# Patient Record
Sex: FEMALE | Race: WHITE | NOT HISPANIC OR LATINO | Employment: FULL TIME | URBAN - METROPOLITAN AREA
[De-identification: names, ages, dates, MRNs, and addresses within clinical notes are randomized per-mention and may not be internally consistent; named-entity substitution may affect disease eponyms.]

---

## 2021-02-14 PROBLEM — F32.A MOOD DISORDER OF DEPRESSED TYPE: Status: ACTIVE | Noted: 2021-02-14

## 2024-01-03 DIAGNOSIS — I10 HTN (HYPERTENSION): ICD-10-CM

## 2024-01-16 ENCOUNTER — OFFICE VISIT (OUTPATIENT)
Dept: FAMILY MEDICINE | Facility: CLINIC | Age: 44
End: 2024-01-16
Attending: FAMILY MEDICINE
Payer: COMMERCIAL

## 2024-01-16 ENCOUNTER — LAB VISIT (OUTPATIENT)
Dept: LAB | Facility: HOSPITAL | Age: 44
End: 2024-01-16
Attending: FAMILY MEDICINE
Payer: COMMERCIAL

## 2024-01-16 ENCOUNTER — TELEPHONE (OUTPATIENT)
Dept: FAMILY MEDICINE | Facility: CLINIC | Age: 44
End: 2024-01-16

## 2024-01-16 VITALS
SYSTOLIC BLOOD PRESSURE: 132 MMHG | OXYGEN SATURATION: 97 % | TEMPERATURE: 99 F | HEART RATE: 85 BPM | HEIGHT: 65 IN | DIASTOLIC BLOOD PRESSURE: 80 MMHG | WEIGHT: 289.56 LBS | BODY MASS INDEX: 48.24 KG/M2

## 2024-01-16 DIAGNOSIS — K21.9 GASTROESOPHAGEAL REFLUX DISEASE, UNSPECIFIED WHETHER ESOPHAGITIS PRESENT: Primary | ICD-10-CM

## 2024-01-16 DIAGNOSIS — E66.01 MORBID OBESITY WITH BMI OF 45.0-49.9, ADULT: ICD-10-CM

## 2024-01-16 DIAGNOSIS — F32.5 MAJOR DEPRESSIVE DISORDER WITH SINGLE EPISODE, IN REMISSION: ICD-10-CM

## 2024-01-16 DIAGNOSIS — F43.10 PTSD (POST-TRAUMATIC STRESS DISORDER): ICD-10-CM

## 2024-01-16 PROBLEM — R10.9 ABDOMINAL PAIN: Status: RESOLVED | Noted: 2020-10-22 | Resolved: 2024-01-16

## 2024-01-16 LAB
ALBUMIN SERPL BCP-MCNC: 3.7 G/DL (ref 3.5–5.2)
ALP SERPL-CCNC: 106 U/L (ref 55–135)
ALT SERPL W/O P-5'-P-CCNC: 21 U/L (ref 10–44)
ANION GAP SERPL CALC-SCNC: 9 MMOL/L (ref 8–16)
AST SERPL-CCNC: 15 U/L (ref 10–40)
BASOPHILS # BLD AUTO: 0.06 K/UL (ref 0–0.2)
BASOPHILS NFR BLD: 0.7 % (ref 0–1.9)
BILIRUB SERPL-MCNC: 0.2 MG/DL (ref 0.1–1)
BUN SERPL-MCNC: 11 MG/DL (ref 6–20)
CALCIUM SERPL-MCNC: 8.9 MG/DL (ref 8.7–10.5)
CHLORIDE SERPL-SCNC: 105 MMOL/L (ref 95–110)
CHOLEST SERPL-MCNC: 168 MG/DL (ref 120–199)
CHOLEST/HDLC SERPL: 4.2 {RATIO} (ref 2–5)
CO2 SERPL-SCNC: 23 MMOL/L (ref 23–29)
CREAT SERPL-MCNC: 0.8 MG/DL (ref 0.5–1.4)
DIFFERENTIAL METHOD BLD: NORMAL
EOSINOPHIL # BLD AUTO: 0.2 K/UL (ref 0–0.5)
EOSINOPHIL NFR BLD: 2.8 % (ref 0–8)
ERYTHROCYTE [DISTWIDTH] IN BLOOD BY AUTOMATED COUNT: 13 % (ref 11.5–14.5)
EST. GFR  (NO RACE VARIABLE): >60 ML/MIN/1.73 M^2
ESTIMATED AVG GLUCOSE: 105 MG/DL (ref 68–131)
GLUCOSE SERPL-MCNC: 100 MG/DL (ref 70–110)
HBA1C MFR BLD: 5.3 % (ref 4–5.6)
HCT VFR BLD AUTO: 39.9 % (ref 37–48.5)
HDLC SERPL-MCNC: 40 MG/DL (ref 40–75)
HDLC SERPL: 23.8 % (ref 20–50)
HGB BLD-MCNC: 13 G/DL (ref 12–16)
IMM GRANULOCYTES # BLD AUTO: 0.03 K/UL (ref 0–0.04)
IMM GRANULOCYTES NFR BLD AUTO: 0.4 % (ref 0–0.5)
LDLC SERPL CALC-MCNC: 110.8 MG/DL (ref 63–159)
LYMPHOCYTES # BLD AUTO: 2.1 K/UL (ref 1–4.8)
LYMPHOCYTES NFR BLD: 24.6 % (ref 18–48)
MCH RBC QN AUTO: 27.4 PG (ref 27–31)
MCHC RBC AUTO-ENTMCNC: 32.6 G/DL (ref 32–36)
MCV RBC AUTO: 84 FL (ref 82–98)
MONOCYTES # BLD AUTO: 0.6 K/UL (ref 0.3–1)
MONOCYTES NFR BLD: 7.1 % (ref 4–15)
NEUTROPHILS # BLD AUTO: 5.4 K/UL (ref 1.8–7.7)
NEUTROPHILS NFR BLD: 64.4 % (ref 38–73)
NONHDLC SERPL-MCNC: 128 MG/DL
NRBC BLD-RTO: 0 /100 WBC
PLATELET # BLD AUTO: 244 K/UL (ref 150–450)
PMV BLD AUTO: 11.4 FL (ref 9.2–12.9)
POTASSIUM SERPL-SCNC: 4 MMOL/L (ref 3.5–5.1)
PROT SERPL-MCNC: 7 G/DL (ref 6–8.4)
RBC # BLD AUTO: 4.75 M/UL (ref 4–5.4)
SODIUM SERPL-SCNC: 137 MMOL/L (ref 136–145)
TRIGL SERPL-MCNC: 86 MG/DL (ref 30–150)
WBC # BLD AUTO: 8.35 K/UL (ref 3.9–12.7)

## 2024-01-16 PROCEDURE — 3075F SYST BP GE 130 - 139MM HG: CPT | Mod: CPTII,S$GLB,, | Performed by: FAMILY MEDICINE

## 2024-01-16 PROCEDURE — 1159F MED LIST DOCD IN RCRD: CPT | Mod: CPTII,S$GLB,, | Performed by: FAMILY MEDICINE

## 2024-01-16 PROCEDURE — 36415 COLL VENOUS BLD VENIPUNCTURE: CPT | Mod: PO | Performed by: FAMILY MEDICINE

## 2024-01-16 PROCEDURE — 80061 LIPID PANEL: CPT | Performed by: FAMILY MEDICINE

## 2024-01-16 PROCEDURE — 99396 PREV VISIT EST AGE 40-64: CPT | Mod: S$GLB,,, | Performed by: FAMILY MEDICINE

## 2024-01-16 PROCEDURE — 3079F DIAST BP 80-89 MM HG: CPT | Mod: CPTII,S$GLB,, | Performed by: FAMILY MEDICINE

## 2024-01-16 PROCEDURE — 1160F RVW MEDS BY RX/DR IN RCRD: CPT | Mod: CPTII,S$GLB,, | Performed by: FAMILY MEDICINE

## 2024-01-16 PROCEDURE — 83036 HEMOGLOBIN GLYCOSYLATED A1C: CPT | Performed by: FAMILY MEDICINE

## 2024-01-16 PROCEDURE — 80053 COMPREHEN METABOLIC PANEL: CPT | Performed by: FAMILY MEDICINE

## 2024-01-16 PROCEDURE — 85025 COMPLETE CBC W/AUTO DIFF WBC: CPT | Performed by: FAMILY MEDICINE

## 2024-01-16 PROCEDURE — 99999 PR PBB SHADOW E&M-EST. PATIENT-LVL IV: CPT | Mod: PBBFAC,,, | Performed by: FAMILY MEDICINE

## 2024-01-16 PROCEDURE — 3008F BODY MASS INDEX DOCD: CPT | Mod: CPTII,S$GLB,, | Performed by: FAMILY MEDICINE

## 2024-01-16 NOTE — PROGRESS NOTES
Subjective:       Patient ID: Juana Garcia is a 43 y.o. female.    Chief Complaint: Annual Exam    43 y old female here to get est . She has gained a substantial mount of weight . Her job is sedentary . Mental health is stable but she will like to f.u with psych . GERD is stable on PPI . Current opth exam . Will like to have Pap done . Past due for dental  exam     Review of Systems   Constitutional: Negative.    HENT: Negative.     Eyes: Negative.    Respiratory: Negative.     Cardiovascular: Negative.    Gastrointestinal: Negative.    Genitourinary: Negative.    Musculoskeletal: Negative.    Skin: Negative.    Hematological: Negative.        Objective:      Physical Exam  Constitutional:       General: She is not in acute distress.     Appearance: She is well-developed. She is not diaphoretic.   HENT:      Head: Normocephalic and atraumatic.      Right Ear: External ear normal.      Left Ear: External ear normal.      Mouth/Throat:      Pharynx: No oropharyngeal exudate.   Eyes:      General: No scleral icterus.        Right eye: No discharge.         Left eye: No discharge.      Conjunctiva/sclera: Conjunctivae normal.      Pupils: Pupils are equal, round, and reactive to light.   Neck:      Thyroid: No thyromegaly.      Vascular: No JVD.      Trachea: No tracheal deviation.   Cardiovascular:      Rate and Rhythm: Normal rate and regular rhythm.      Heart sounds: Normal heart sounds. No murmur heard.     No friction rub. No gallop.   Pulmonary:      Effort: Pulmonary effort is normal. No respiratory distress.      Breath sounds: Normal breath sounds. No stridor. No wheezing or rales.   Chest:      Chest wall: No tenderness.   Abdominal:      General: Bowel sounds are normal. There is no distension.      Palpations: Abdomen is soft.      Tenderness: There is no abdominal tenderness. There is no guarding or rebound.   Musculoskeletal:         General: Normal range of motion.      Cervical back: Normal range of  motion and neck supple.   Lymphadenopathy:      Cervical: No cervical adenopathy.   Skin:     General: Skin is warm and dry.   Neurological:      Mental Status: She is alert and oriented to person, place, and time.   Psychiatric:         Behavior: Behavior normal.         Thought Content: Thought content normal.         Judgment: Judgment normal.         Assessment:       Juana was seen today for annual exam.    Diagnoses and all orders for this visit:    Gastroesophageal reflux disease, unspecified whether esophagitis present    Morbid obesity with BMI of 45.0-49.9, adult  -     Ambulatory referral/consult to Gynecology; Future  -     CBC Auto Differential; Future  -     Comprehensive Metabolic Panel; Future  -     Hemoglobin A1C; Future  -     Lipid Panel; Future    PTSD (post-traumatic stress disorder)    Major depressive disorder with single episode, in remission       Plan:   PPI   Diet and exercise   F.u with psych

## 2024-01-19 ENCOUNTER — OFFICE VISIT (OUTPATIENT)
Dept: OBSTETRICS AND GYNECOLOGY | Facility: CLINIC | Age: 44
End: 2024-01-19
Attending: FAMILY MEDICINE
Payer: COMMERCIAL

## 2024-01-19 VITALS
SYSTOLIC BLOOD PRESSURE: 120 MMHG | DIASTOLIC BLOOD PRESSURE: 76 MMHG | BODY MASS INDEX: 47.93 KG/M2 | HEIGHT: 65 IN | WEIGHT: 287.69 LBS

## 2024-01-19 DIAGNOSIS — Z12.4 PAPANICOLAOU SMEAR FOR CERVICAL CANCER SCREENING: ICD-10-CM

## 2024-01-19 DIAGNOSIS — Z30.431 IUD CHECK UP: ICD-10-CM

## 2024-01-19 DIAGNOSIS — E66.01 MORBID OBESITY WITH BMI OF 45.0-49.9, ADULT: ICD-10-CM

## 2024-01-19 DIAGNOSIS — N90.89 VULVAR IRRITATION: ICD-10-CM

## 2024-01-19 DIAGNOSIS — Z01.419 ROUTINE GYNECOLOGICAL EXAMINATION: Primary | ICD-10-CM

## 2024-01-19 PROCEDURE — 99386 PREV VISIT NEW AGE 40-64: CPT | Mod: S$GLB,,, | Performed by: NURSE PRACTITIONER

## 2024-01-19 PROCEDURE — 1160F RVW MEDS BY RX/DR IN RCRD: CPT | Mod: CPTII,S$GLB,, | Performed by: NURSE PRACTITIONER

## 2024-01-19 PROCEDURE — 88175 CYTOPATH C/V AUTO FLUID REDO: CPT | Performed by: NURSE PRACTITIONER

## 2024-01-19 PROCEDURE — 3078F DIAST BP <80 MM HG: CPT | Mod: CPTII,S$GLB,, | Performed by: NURSE PRACTITIONER

## 2024-01-19 PROCEDURE — 3008F BODY MASS INDEX DOCD: CPT | Mod: CPTII,S$GLB,, | Performed by: NURSE PRACTITIONER

## 2024-01-19 PROCEDURE — 99999 PR PBB SHADOW E&M-EST. PATIENT-LVL III: CPT | Mod: PBBFAC,,, | Performed by: NURSE PRACTITIONER

## 2024-01-19 PROCEDURE — 1159F MED LIST DOCD IN RCRD: CPT | Mod: CPTII,S$GLB,, | Performed by: NURSE PRACTITIONER

## 2024-01-19 PROCEDURE — 3074F SYST BP LT 130 MM HG: CPT | Mod: CPTII,S$GLB,, | Performed by: NURSE PRACTITIONER

## 2024-01-19 PROCEDURE — 3044F HG A1C LEVEL LT 7.0%: CPT | Mod: CPTII,S$GLB,, | Performed by: NURSE PRACTITIONER

## 2024-01-19 PROCEDURE — 87624 HPV HI-RISK TYP POOLED RSLT: CPT | Performed by: NURSE PRACTITIONER

## 2024-01-19 RX ORDER — CLOTRIMAZOLE AND BETAMETHASONE DIPROPIONATE 10; .64 MG/G; MG/G
CREAM TOPICAL 2 TIMES DAILY
Qty: 45 G | Refills: 2 | Status: SHIPPED | OUTPATIENT
Start: 2024-01-19 | End: 2024-02-18

## 2024-01-19 NOTE — PROGRESS NOTES
"  Subjective:       Patient ID: Juana Garcia is a 43 y.o. female.    Chief Complaint:  Annual Exam and Well Woman      History of Present Illness  HPI  Satisfied with Mirena as method of birth control she also has a tubal ligation to prevent pregnancy   Mammogram is scheduled to be done on 2024   Complains of vaginal irritation   Health Maintenance   Topic Date Due    Mammogram  2023    TETANUS VACCINE  2024    Hepatitis C Screening  Completed    Lipid Panel  Completed     GYN & OB History  No LMP recorded. (Menstrual status: Birth Control).   Date of Last Pap: over 3 years ago     OB History    Para Term  AB Living   3 1 1   2 1   SAB IAB Ectopic Multiple Live Births   2       1      # Outcome Date GA Lbr Jorgito/2nd Weight Sex Delivery Anes PTL Lv   3 Term 07    F Vag-Spont   DEEPTI   2 SAB       None     1 SAB                Review of Systems  Review of Systems        Objective:   /76   Ht 5' 5" (1.651 m)   Wt 130.5 kg (287 lb 11.2 oz)   BMI 47.88 kg/m²    Physical Exam:   Constitutional: She is oriented to person, place, and time. She appears well-developed and well-nourished.        Pulmonary/Chest: Right breast exhibits no inverted nipple, no mass, no nipple discharge, no skin change, no tenderness and no swelling. Left breast exhibits no inverted nipple, no mass, no nipple discharge, no skin change, no tenderness and no swelling.        Abdominal: Soft.     Genitourinary:    Inguinal canal and vagina normal.            Pelvic exam was performed with patient supine.   The external female genitalia was normal.   Genitalia hair distrobution normal .     Labial bartholins normal.Cervix is normal. Vagina exhibits no lesion. No erythema, vaginal discharge, tenderness, bleeding, rectocele, cystocele or prolapse of vaginal walls in the vagina.    No foreign body in the vagina.      No signs of injury in the vagina.      pap smear completedIUD strings visualized.            "   Neurological: She is alert and oriented to person, place, and time.    Skin: Skin is warm and dry.    Psychiatric: She has a normal mood and affect. Her behavior is normal. Judgment and thought content normal.        Assessment:        1. Routine gynecological examination    2. Morbid obesity with BMI of 45.0-49.9, adult    3. Papanicolaou smear for cervical cancer screening    4. IUD check up    5. Vulvar irritation                Plan:            Juana was seen today for annual exam and well woman.    Diagnoses and all orders for this visit:    Routine gynecological examination    Morbid obesity with BMI of 45.0-49.9, adult  -     Ambulatory referral/consult to Gynecology    Papanicolaou smear for cervical cancer screening  -     Liquid-Based Pap Smear, Screening  -     HPV High Risk Genotypes, PCR    IUD check up    Vulvar irritation  -     clotrimazole-betamethasone 1-0.05% (LOTRISONE) cream; Apply topically 2 (two) times daily.      Return to clinic in one year for WWRIK

## 2024-01-24 LAB
FINAL PATHOLOGIC DIAGNOSIS: NORMAL
Lab: NORMAL

## 2024-01-25 LAB
HPV HR 12 DNA SPEC QL NAA+PROBE: NEGATIVE
HPV16 AG SPEC QL: NEGATIVE
HPV18 DNA SPEC QL NAA+PROBE: NEGATIVE

## 2024-01-29 ENCOUNTER — HOSPITAL ENCOUNTER (EMERGENCY)
Facility: HOSPITAL | Age: 44
Discharge: HOME | End: 2024-01-29
Attending: EMERGENCY MEDICINE
Payer: COMMERCIAL

## 2024-01-29 ENCOUNTER — PATIENT MESSAGE (OUTPATIENT)
Dept: FAMILY MEDICINE | Facility: CLINIC | Age: 44
End: 2024-01-29
Payer: COMMERCIAL

## 2024-01-29 VITALS
TEMPERATURE: 99 F | SYSTOLIC BLOOD PRESSURE: 154 MMHG | WEIGHT: 291.44 LBS | OXYGEN SATURATION: 98 % | HEART RATE: 87 BPM | RESPIRATION RATE: 18 BRPM | DIASTOLIC BLOOD PRESSURE: 87 MMHG | BODY MASS INDEX: 48.5 KG/M2

## 2024-01-29 DIAGNOSIS — R06.02 SOB (SHORTNESS OF BREATH): Primary | ICD-10-CM

## 2024-01-29 DIAGNOSIS — R03.0 ELEVATED BLOOD PRESSURE READING: ICD-10-CM

## 2024-01-29 DIAGNOSIS — E66.01 MORBID OBESITY: ICD-10-CM

## 2024-01-29 DIAGNOSIS — E66.01 MORBID OBESITY WITH BMI OF 45.0-49.9, ADULT: Primary | ICD-10-CM

## 2024-01-29 DIAGNOSIS — R07.9 CHEST PAIN: ICD-10-CM

## 2024-01-29 DIAGNOSIS — F12.10 TETRAHYDROCANNABINOL (THC) USE DISORDER, MILD, ABUSE: ICD-10-CM

## 2024-01-29 DIAGNOSIS — F41.9 ANXIETY: ICD-10-CM

## 2024-01-29 LAB
ALBUMIN SERPL BCP-MCNC: 3.7 G/DL (ref 3.5–5.2)
ALP SERPL-CCNC: 100 U/L (ref 55–135)
ALT SERPL W/O P-5'-P-CCNC: 23 U/L (ref 10–44)
AMPHET+METHAMPHET UR QL: NEGATIVE
ANION GAP SERPL CALC-SCNC: 10 MMOL/L (ref 8–16)
AST SERPL-CCNC: 18 U/L (ref 10–40)
BARBITURATES UR QL SCN>200 NG/ML: NEGATIVE
BASOPHILS # BLD AUTO: 0.05 K/UL (ref 0–0.2)
BASOPHILS NFR BLD: 0.6 % (ref 0–1.9)
BENZODIAZ UR QL SCN>200 NG/ML: NEGATIVE
BILIRUB SERPL-MCNC: 0.4 MG/DL (ref 0.1–1)
BNP SERPL-MCNC: 39 PG/ML (ref 0–99)
BUN SERPL-MCNC: 8 MG/DL (ref 6–20)
BZE UR QL SCN: NEGATIVE
CALCIUM SERPL-MCNC: 9.1 MG/DL (ref 8.7–10.5)
CANNABINOIDS UR QL SCN: ABNORMAL
CHLORIDE SERPL-SCNC: 105 MMOL/L (ref 95–110)
CK SERPL-CCNC: 79 U/L (ref 20–180)
CO2 SERPL-SCNC: 23 MMOL/L (ref 23–29)
CREAT SERPL-MCNC: 0.8 MG/DL (ref 0.5–1.4)
CREAT UR-MCNC: 31.9 MG/DL (ref 15–325)
DIFFERENTIAL METHOD BLD: NORMAL
EOSINOPHIL # BLD AUTO: 0.2 K/UL (ref 0–0.5)
EOSINOPHIL NFR BLD: 2 % (ref 0–8)
ERYTHROCYTE [DISTWIDTH] IN BLOOD BY AUTOMATED COUNT: 12.9 % (ref 11.5–14.5)
EST. GFR  (NO RACE VARIABLE): >60 ML/MIN/1.73 M^2
GLUCOSE SERPL-MCNC: 96 MG/DL (ref 70–110)
HCT VFR BLD AUTO: 37 % (ref 37–48.5)
HCV AB SERPL QL IA: NEGATIVE
HEP C VIRUS HOLD SPECIMEN: NORMAL
HGB BLD-MCNC: 12.5 G/DL (ref 12–16)
HIV 1+2 AB+HIV1 P24 AG SERPL QL IA: NEGATIVE
IMM GRANULOCYTES # BLD AUTO: 0.04 K/UL (ref 0–0.04)
IMM GRANULOCYTES NFR BLD AUTO: 0.5 % (ref 0–0.5)
LYMPHOCYTES # BLD AUTO: 2.1 K/UL (ref 1–4.8)
LYMPHOCYTES NFR BLD: 23.8 % (ref 18–48)
MCH RBC QN AUTO: 27.5 PG (ref 27–31)
MCHC RBC AUTO-ENTMCNC: 33.8 G/DL (ref 32–36)
MCV RBC AUTO: 82 FL (ref 82–98)
METHADONE UR QL SCN>300 NG/ML: NEGATIVE
MONOCYTES # BLD AUTO: 0.5 K/UL (ref 0.3–1)
MONOCYTES NFR BLD: 6.2 % (ref 4–15)
NEUTROPHILS # BLD AUTO: 5.8 K/UL (ref 1.8–7.7)
NEUTROPHILS NFR BLD: 66.9 % (ref 38–73)
NRBC BLD-RTO: 0 /100 WBC
OPIATES UR QL SCN: NEGATIVE
PCP UR QL SCN>25 NG/ML: NEGATIVE
PLATELET # BLD AUTO: 242 K/UL (ref 150–450)
PMV BLD AUTO: 10.2 FL (ref 9.2–12.9)
POTASSIUM SERPL-SCNC: 4 MMOL/L (ref 3.5–5.1)
PROT SERPL-MCNC: 7.3 G/DL (ref 6–8.4)
RBC # BLD AUTO: 4.54 M/UL (ref 4–5.4)
SODIUM SERPL-SCNC: 138 MMOL/L (ref 136–145)
TOXICOLOGY INFORMATION: ABNORMAL
TROPONIN I SERPL DL<=0.01 NG/ML-MCNC: <0.006 NG/ML (ref 0–0.03)
WBC # BLD AUTO: 8.71 K/UL (ref 3.9–12.7)

## 2024-01-29 PROCEDURE — 80307 DRUG TEST PRSMV CHEM ANLYZR: CPT | Performed by: EMERGENCY MEDICINE

## 2024-01-29 PROCEDURE — 85025 COMPLETE CBC W/AUTO DIFF WBC: CPT | Performed by: NURSE PRACTITIONER

## 2024-01-29 PROCEDURE — 84484 ASSAY OF TROPONIN QUANT: CPT | Performed by: NURSE PRACTITIONER

## 2024-01-29 PROCEDURE — 87389 HIV-1 AG W/HIV-1&-2 AB AG IA: CPT | Performed by: EMERGENCY MEDICINE

## 2024-01-29 PROCEDURE — 82550 ASSAY OF CK (CPK): CPT | Performed by: NURSE PRACTITIONER

## 2024-01-29 PROCEDURE — 99285 EMERGENCY DEPT VISIT HI MDM: CPT | Mod: 25

## 2024-01-29 PROCEDURE — 93010 ELECTROCARDIOGRAM REPORT: CPT | Mod: ,,, | Performed by: INTERNAL MEDICINE

## 2024-01-29 PROCEDURE — 80053 COMPREHEN METABOLIC PANEL: CPT | Performed by: NURSE PRACTITIONER

## 2024-01-29 PROCEDURE — 86803 HEPATITIS C AB TEST: CPT | Performed by: EMERGENCY MEDICINE

## 2024-01-29 PROCEDURE — 93005 ELECTROCARDIOGRAM TRACING: CPT

## 2024-01-29 PROCEDURE — 83880 ASSAY OF NATRIURETIC PEPTIDE: CPT | Performed by: NURSE PRACTITIONER

## 2024-01-29 RX ORDER — HYDROCHLOROTHIAZIDE 12.5 MG/1
12.5 TABLET ORAL DAILY
Qty: 30 TABLET | Refills: 0 | Status: SHIPPED | OUTPATIENT
Start: 2024-01-29 | End: 2025-01-28

## 2024-01-29 RX ORDER — ASPIRIN 325 MG
325 TABLET ORAL
Status: DISCONTINUED | OUTPATIENT
Start: 2024-01-29 | End: 2024-01-29

## 2024-01-29 NOTE — FIRST PROVIDER EVALUATION
Medical screening examination initiated.  I have conducted a focused provider triage encounter, findings are as follows:    Brief history of present illness: 43 year old female with complaint of SOB and chest discomfort X 3 days.     There were no vitals filed for this visit.    Pertinent physical exam: alert, no respiratory distress    Brief workup plan: cardiac work up    Preliminary workup initiated; this workup will be continued and followed by the physician or advanced practice provider that is assigned to the patient when roomed.

## 2024-02-09 ENCOUNTER — PATIENT MESSAGE (OUTPATIENT)
Dept: RADIOLOGY | Facility: HOSPITAL | Age: 44
End: 2024-02-09
Payer: COMMERCIAL

## 2024-03-05 ENCOUNTER — CLINICAL SUPPORT (OUTPATIENT)
Dept: INTERNAL MEDICINE | Facility: CLINIC | Age: 44
End: 2024-03-05
Payer: COMMERCIAL

## 2024-03-05 DIAGNOSIS — E66.01 MORBID OBESITY WITH BMI OF 45.0-49.9, ADULT: ICD-10-CM

## 2024-03-05 NOTE — PROGRESS NOTES
"The patient location is: [place]  The chief complaint leading to consultation is: nutrition referral    Visit type: audiovisual    Face to Face time with patient: 65 minutes  75 minutes of total time spent on the encounter, which includes face to face time and non-face to face time preparing to see the patient (eg, review of tests), Obtaining and/or reviewing separately obtained history, Documenting clinical information in the electronic or other health record, Independently interpreting results (not separately reported) and communicating results to the patient/family/caregiver, or Care coordination (not separately reported).         Each patient to whom he or she provides medical services by telemedicine is:  (1) informed of the relationship between the physician and patient and the respective role of any other health care provider with respect to management of the patient; and (2) notified that he or she may decline to receive medical services by telemedicine and may withdraw from such care at any time.    Notes:   Nutrition Assessment      Client name:  Juana Garcia  :  1980  Age:  43 y.o.  Gender:  female    Client states:  referred by Amanda Lopes MD with a diagnosis of:   -Morbid obesity with BMI of 45.0-49.9, adult       Reports needing to lose weight.  Emergency room recently and was told to follow dash diet and to restrict intake of sodium to 1500 mg to improve blood pressure.    Exercise: none  Works 9 hour days.  works long days.  Works form home 2 days per week + 3 days in the office.    Sample intake:  Breakfast: yes on work days// Fage yogurt (plain) with plain oatmeal + 1 cup of no sugar added peaches/pears  Lunch: recipes from DASH diet cookbook// uses Mrs. Prince seasonings  Snack: cucumbers// fruits   Dinner:  Drinks: diet coke, water, coffee when home    Goal: 145-150 lbs    Anthropometrics  Height:  65"     Weight:  275.6 lbs (self reported from home scale)  BMI:  " 46  % Body Fat:  n/a     RECENT WEIGHTS      Weight (lb) Weight (kg) BMI (Calculated)   10/22/2020 255.73  116  42.6    1/4/2021 269.18  122.1  44.8    1/13/2021 268.08  121.6  44.6    3/10/2021 272.16  123.45     5/8/2021 273.15  123.9     5/31/2021 268.63  121.85     2/22/2022 268  121.564  44.6    1/16/2024 289.57  131.35  48.2    1/19/2024 287.7  130.5  47.9    1/29/2024 291.45  132.2           Clinical Signs/Symptoms  N/V/D:  none noted  Appetite:  good       Past Medical History:   Diagnosis Date    Arthritis     Fibromyalgia     GERD (gastroesophageal reflux disease)     HTN (hypertension) 1/4/2021    PONV (postoperative nausea and vomiting)        Past Surgical History:   Procedure Laterality Date    CLEFT LIP REPAIR      x 6    COLONOSCOPY N/A 10/22/2020    Procedure: COLONOSCOPY;  Surgeon: Orlin Fisher MD;  Location: [facility];  Service: Endoscopy;  Laterality: N/A;    COSMETIC SURGERY      cleft lip repair    ESOPHAGOGASTRODUODENOSCOPY      ESOPHAGOGASTRODUODENOSCOPY N/A 10/22/2020    Procedure: EGD (ESOPHAGOGASTRODUODENOSCOPY);  Surgeon: Orlin Fisher MD;  Location: [facility];  Service: Endoscopy;  Laterality: N/A;    HAND SURGERY      LAPAROSCOPIC OCCLUSION OF OVIDUCTS BY DEVICE Bilateral 11/7/2018    Procedure: OCCLUSION, FALLOPIAN TUBE, LAPAROSCOPIC, USING DEVICE;  Surgeon: Alayna Arechiga MD;  Location: [facility] OR;  Service: OB/GYN;  Laterality: Bilateral;    REMOVAL OF INTRAUTERINE DEVICE (IUD) N/A 11/7/2018    Procedure: REMOVAL, INTRAUTERINE DEVICE;  Surgeon: Alayna Arechiga MD;  Location: [facility] OR;  Service: OB/GYN;  Laterality: N/A;    TUBAL LIGATION         Medications    has a current medication list which includes the following prescription(s): fluticasone propionate, fluvoxamine, hydrochlorothiazide, loratadine, nexium, and trazodone.    Vitamins, Minerals, and/or Supplements:  not discussed     Food/Medication Interactions:  Reviewed     Food Allergies or Intolerances:  NKFA noted  in chart     Social History    Marital status:    Employment:  yes    Social History     Tobacco Use    Smoking status: Former     Current packs/day: 0.00     Types: Cigarettes     Quit date: 6/25/2013     Years since quitting: 10.7    Smokeless tobacco: Never   Substance Use Topics    Alcohol use: No        Lab Reports   Sodium   Date Value Ref Range Status   01/29/2024 138 136 - 145 mmol/L Final     Potassium   Date Value Ref Range Status   01/29/2024 4.0 3.5 - 5.1 mmol/L Final     Chloride   Date Value Ref Range Status   01/29/2024 105 95 - 110 mmol/L Final     CO2   Date Value Ref Range Status   01/29/2024 23 23 - 29 mmol/L Final     Glucose   Date Value Ref Range Status   01/29/2024 96 70 - 110 mg/dL Final     BUN   Date Value Ref Range Status   01/29/2024 8 6 - 20 mg/dL Final     Creatinine   Date Value Ref Range Status   01/29/2024 0.8 0.5 - 1.4 mg/dL Final     Calcium   Date Value Ref Range Status   01/29/2024 9.1 8.7 - 10.5 mg/dL Final     Total Protein   Date Value Ref Range Status   01/29/2024 7.3 6.0 - 8.4 g/dL Final     Albumin   Date Value Ref Range Status   01/29/2024 3.7 3.5 - 5.2 g/dL Final     Total Bilirubin   Date Value Ref Range Status   01/29/2024 0.4 0.1 - 1.0 mg/dL Final     Comment:     For infants and newborns, interpretation of results should be based  on gestational age, weight and in agreement with clinical  observations.    Premature Infant recommended reference ranges:  Up to 24 hours.............<8.0 mg/dL  Up to 48 hours............<12.0 mg/dL  3-5 days..................<15.0 mg/dL  6-29 days.................<15.0 mg/dL       Alkaline Phosphatase   Date Value Ref Range Status   01/29/2024 100 55 - 135 U/L Final     AST   Date Value Ref Range Status   01/29/2024 18 10 - 40 U/L Final     ALT   Date Value Ref Range Status   01/29/2024 23 10 - 44 U/L Final     Anion Gap   Date Value Ref Range Status   01/29/2024 10 8 - 16 mmol/L Final     eGFR if    Date Value Ref  Range Status   09/15/2020 >60.0 >60 mL/min/1.73 m^2 Final     eGFR if non    Date Value Ref Range Status   09/15/2020 >60.0 >60 mL/min/1.73 m^2 Final     Comment:     Calculation used to obtain the estimated glomerular filtration  rate (eGFR) is the CKD-EPI equation.         Lab Results   Component Value Date    WBC 8.71 01/29/2024    HGB 12.5 01/29/2024    HCT 37.0 01/29/2024    MCV 82 01/29/2024     01/29/2024        Lab Results   Component Value Date    CHOL 168 01/16/2024     Lab Results   Component Value Date    HDL 40 01/16/2024     Lab Results   Component Value Date    LDLCALC 110.8 01/16/2024     Lab Results   Component Value Date    TRIG 86 01/16/2024     Lab Results   Component Value Date    CHOLHDL 23.8 01/16/2024     Lab Results   Component Value Date    HGBA1C 5.3 01/16/2024     BP Readings from Last 1 Encounters:   01/29/24 (!) 154/87       Food History  Provided above    Exercise History:  provided above    Cultural/Spiritual/Personal Preferences:  None identified    Support System:  family/friends    State of Change:  Contemplation    Barriers to Change:  none    Diagnosis    obesity related to excess energy intake as evidenced by BMI 46.    Intervention    RMR (Method:  [place] St. Jeor):  1906 kcal  Activity Factor:   1.2     SLIM:  2287 kcal    Goals:  1.  100-150 grams of protein per day  2.  Increase daily fiber. Gradually work towards goal of 25 grams of fiber per day.     Nutrition Education  The following education was provided to the patient:  Discussed meal planning/[employer]'s My Plate design.  Discussed weight management.  Suggested dietary modifications based on current dietary behaviors and individual food preferences.  Discussed importance of small behavior/habit changes in improving long-term adherence and sustainability.    Patient verbalized understanding of nutrition education and recommendations received.    Handouts Provided, emailed   Meal  Ideas    Monitoring/Evaluation    Monitor the following:  Weight  BMI  Caloric intake      Follow Up Plan:  2 months

## 2024-04-26 ENCOUNTER — HOSPITAL ENCOUNTER (OUTPATIENT)
Dept: RADIOLOGY | Facility: HOSPITAL | Age: 44
Discharge: HOME | End: 2024-04-26
Attending: FAMILY MEDICINE
Payer: COMMERCIAL

## 2024-04-26 PROCEDURE — 77067 SCR MAMMO BI INCL CAD: CPT | Mod: TC

## 2024-04-26 PROCEDURE — 77067 SCR MAMMO BI INCL CAD: CPT | Mod: 26,,, | Performed by: RADIOLOGY

## 2024-04-26 PROCEDURE — 77063 BREAST TOMOSYNTHESIS BI: CPT | Mod: 26,,, | Performed by: RADIOLOGY

## 2024-05-10 ENCOUNTER — OFFICE VISIT (OUTPATIENT)
Dept: FAMILY MEDICINE | Facility: CLINIC | Age: 44
End: 2024-05-10
Payer: COMMERCIAL

## 2024-05-10 VITALS
HEART RATE: 98 BPM | SYSTOLIC BLOOD PRESSURE: 132 MMHG | WEIGHT: 283.31 LBS | BODY MASS INDEX: 47.2 KG/M2 | HEIGHT: 65 IN | OXYGEN SATURATION: 98 % | DIASTOLIC BLOOD PRESSURE: 82 MMHG

## 2024-05-10 DIAGNOSIS — J02.9 SORE THROAT: ICD-10-CM

## 2024-05-10 DIAGNOSIS — L29.9 ITCHING: ICD-10-CM

## 2024-05-10 DIAGNOSIS — R09.82 PND (POST-NASAL DRIP): Primary | ICD-10-CM

## 2024-05-10 LAB
CTP QC/QA: YES
S PYO RRNA THROAT QL PROBE: NEGATIVE

## 2024-05-10 PROCEDURE — 99999 PR PBB SHADOW E&M-EST. PATIENT-LVL III: CPT | Mod: PBBFAC,,, | Performed by: NURSE PRACTITIONER

## 2024-05-10 PROCEDURE — 3044F HG A1C LEVEL LT 7.0%: CPT | Mod: CPTII,S$GLB,, | Performed by: NURSE PRACTITIONER

## 2024-05-10 PROCEDURE — 3075F SYST BP GE 130 - 139MM HG: CPT | Mod: CPTII,S$GLB,, | Performed by: NURSE PRACTITIONER

## 2024-05-10 PROCEDURE — 99213 OFFICE O/P EST LOW 20 MIN: CPT | Mod: S$GLB,,, | Performed by: NURSE PRACTITIONER

## 2024-05-10 PROCEDURE — 3008F BODY MASS INDEX DOCD: CPT | Mod: CPTII,S$GLB,, | Performed by: NURSE PRACTITIONER

## 2024-05-10 PROCEDURE — 1159F MED LIST DOCD IN RCRD: CPT | Mod: CPTII,S$GLB,, | Performed by: NURSE PRACTITIONER

## 2024-05-10 PROCEDURE — 87880 STREP A ASSAY W/OPTIC: CPT | Mod: QW,S$GLB,, | Performed by: NURSE PRACTITIONER

## 2024-05-10 PROCEDURE — 3079F DIAST BP 80-89 MM HG: CPT | Mod: CPTII,S$GLB,, | Performed by: NURSE PRACTITIONER

## 2024-05-10 PROCEDURE — 1160F RVW MEDS BY RX/DR IN RCRD: CPT | Mod: CPTII,S$GLB,, | Performed by: NURSE PRACTITIONER

## 2024-05-10 RX ORDER — LEVOCETIRIZINE DIHYDROCHLORIDE 5 MG/1
5 TABLET, FILM COATED ORAL NIGHTLY
Qty: 30 TABLET | Refills: 0 | Status: SHIPPED | OUTPATIENT
Start: 2024-05-10 | End: 2025-05-10

## 2024-05-10 RX ORDER — TRIAMCINOLONE ACETONIDE 1 MG/G
OINTMENT TOPICAL 2 TIMES DAILY
Qty: 30 G | Refills: 1 | Status: SHIPPED | OUTPATIENT
Start: 2024-05-10

## 2024-05-10 NOTE — PROGRESS NOTES
"Subjective:       Patient ID: Juana Garcia is a 44 y.o. female.    Chief Complaint: Neck Pain  Pt reports to clinic with chief complaint of "throat discomfort feels stiff and full".  "Glands are tender".  No fever, chills cough.  Notes rhinorrhea and post nasal drip.  No acid reflux.  Additionally, pt complains of itching to left foot.  Denies rash eruption.  "I rub it until the skin comes off".  Denies exposure to new chemicals, plants, food.     Past Medical History:   Diagnosis Date    Arthritis     Fibromyalgia     GERD (gastroesophageal reflux disease)     HTN (hypertension) 1/4/2021    PONV (postoperative nausea and vomiting)       Current Outpatient Medications on File Prior to Visit   Medication Sig Dispense Refill    fluticasone propionate (FLONASE) 50 mcg/actuation nasal spray 1 spray by Each Nostril route daily as needed.      fluvoxaMINE (LUVOX) 50 MG Tab tablet Take 1 tablet (50 mg total) by mouth every evening. 30 tablet 2    hydroCHLOROthiazide (HYDRODIURIL) 12.5 MG Tab Take 1 tablet (12.5 mg total) by mouth once daily. 30 tablet 0    NEXIUM 20 mg capsule Take 20 mg by mouth before breakfast.      traZODone (DESYREL) 150 MG tablet Take 1 tablet (150 mg total) by mouth nightly as needed for Insomnia. (Patient taking differently: Take 75 mg by mouth nightly as needed for Insomnia.) 90 tablet 0    [DISCONTINUED] loratadine (CLARITIN) 10 mg tablet Take 1 tablet (10 mg total) by mouth once daily. 90 tablet 3     No current facility-administered medications on file prior to visit.      Sore Throat   This is a new problem. The current episode started in the past 7 days. The problem has been unchanged. There has been no fever. The pain is mild. Pertinent negatives include no congestion, coughing, shortness of breath, swollen glands or trouble swallowing. She has tried nothing for the symptoms.     Review of Systems   Constitutional: Negative.    HENT:  Positive for rhinorrhea and sore throat. Negative for " congestion, hearing loss and trouble swallowing.    Respiratory:  Negative for cough, chest tightness, shortness of breath and wheezing.    Cardiovascular: Negative.    Gastrointestinal: Negative.    Genitourinary: Negative.    Skin:  Positive for rash. Negative for wound.   Psychiatric/Behavioral: Negative.         Objective:      Physical Exam  Vitals reviewed.   Constitutional:       Appearance: She is obese.   HENT:      Head: Normocephalic.      Right Ear: External ear normal.      Left Ear: External ear normal.      Nose: Rhinorrhea present.      Mouth/Throat:      Comments: cobblestone  Eyes:      Extraocular Movements: Extraocular movements intact.   Cardiovascular:      Rate and Rhythm: Normal rate.      Heart sounds: Normal heart sounds.   Pulmonary:      Effort: Pulmonary effort is normal.      Breath sounds: Normal breath sounds.   Musculoskeletal:      Cervical back: Normal range of motion.   Skin:     Coloration: Skin is not jaundiced.      Findings: No rash.   Neurological:      Mental Status: She is alert and oriented to person, place, and time.   Psychiatric:         Behavior: Behavior normal.         Assessment:       1. PND (post-nasal drip)    2. Sore throat    3. Itching        Plan:   PND (post-nasal drip)  -     levocetirizine (XYZAL) 5 MG tablet; Take 1 tablet (5 mg total) by mouth every evening.  Dispense: 30 tablet; Refill: 0    Sore throat  -     POCT Rapid Strep A    Itching  -     triamcinolone acetonide 0.1% (KENALOG) 0.1 % ointment; Apply topically 2 (two) times daily.  Dispense: 30 g; Refill: 1      No follow-ups on file.

## 2024-06-19 ENCOUNTER — TELEPHONE (OUTPATIENT)
Dept: FAMILY MEDICINE | Facility: CLINIC | Age: 44
End: 2024-06-19
Payer: COMMERCIAL

## 2024-06-19 NOTE — TELEPHONE ENCOUNTER
----- Message from Priyanka Sanchez sent at 6/19/2024  8:29 AM CDT -----  Contact: Juana  .Type:  Patient Returning Call    Who Called: Juana   Who Left Message for Patient: nurse   Does the patient know what this is regarding?: yes   Would the patient rather a call back or a response via [facility]?  Call back   Best Call Back Number: .. [phone]    Additional Information:          Thanks

## 2024-06-25 ENCOUNTER — OFFICE VISIT (OUTPATIENT)
Dept: FAMILY MEDICINE | Facility: CLINIC | Age: 44
End: 2024-06-25
Attending: FAMILY MEDICINE
Payer: COMMERCIAL

## 2024-06-25 ENCOUNTER — TELEPHONE (OUTPATIENT)
Dept: PHYSICAL MEDICINE AND REHAB | Facility: CLINIC | Age: 44
End: 2024-06-25
Payer: COMMERCIAL

## 2024-06-25 ENCOUNTER — TELEPHONE (OUTPATIENT)
Dept: FAMILY MEDICINE | Facility: CLINIC | Age: 44
End: 2024-06-25

## 2024-06-25 VITALS
BODY MASS INDEX: 47.71 KG/M2 | DIASTOLIC BLOOD PRESSURE: 80 MMHG | WEIGHT: 286.38 LBS | SYSTOLIC BLOOD PRESSURE: 120 MMHG | OXYGEN SATURATION: 97 % | TEMPERATURE: 99 F | HEART RATE: 80 BPM | HEIGHT: 65 IN

## 2024-06-25 DIAGNOSIS — B35.3 TINEA PEDIS OF LEFT FOOT: ICD-10-CM

## 2024-06-25 DIAGNOSIS — R20.2 PARESTHESIA OF BOTH FEET: Primary | ICD-10-CM

## 2024-06-25 PROCEDURE — 3008F BODY MASS INDEX DOCD: CPT | Mod: CPTII,S$GLB,, | Performed by: FAMILY MEDICINE

## 2024-06-25 PROCEDURE — 1160F RVW MEDS BY RX/DR IN RCRD: CPT | Mod: CPTII,S$GLB,, | Performed by: FAMILY MEDICINE

## 2024-06-25 PROCEDURE — 3074F SYST BP LT 130 MM HG: CPT | Mod: CPTII,S$GLB,, | Performed by: FAMILY MEDICINE

## 2024-06-25 PROCEDURE — 99999 PR PBB SHADOW E&M-EST. PATIENT-LVL IV: CPT | Mod: PBBFAC,,, | Performed by: FAMILY MEDICINE

## 2024-06-25 PROCEDURE — 1159F MED LIST DOCD IN RCRD: CPT | Mod: CPTII,S$GLB,, | Performed by: FAMILY MEDICINE

## 2024-06-25 PROCEDURE — 3079F DIAST BP 80-89 MM HG: CPT | Mod: CPTII,S$GLB,, | Performed by: FAMILY MEDICINE

## 2024-06-25 PROCEDURE — 99214 OFFICE O/P EST MOD 30 MIN: CPT | Mod: S$GLB,,, | Performed by: FAMILY MEDICINE

## 2024-06-25 PROCEDURE — 3044F HG A1C LEVEL LT 7.0%: CPT | Mod: CPTII,S$GLB,, | Performed by: FAMILY MEDICINE

## 2024-06-25 RX ORDER — FLUCONAZOLE 150 MG/1
TABLET ORAL
Qty: 4 TABLET | Refills: 0 | Status: SHIPPED | OUTPATIENT
Start: 2024-06-25

## 2024-06-25 RX ORDER — OMEPRAZOLE 20 MG/1
20 TABLET, DELAYED RELEASE ORAL EVERY MORNING
COMMUNITY

## 2024-06-25 NOTE — PROGRESS NOTES
Subjective:       Patient ID: Juana Arellano is a 44 y.o. female.    Chief Complaint: Foot Problem (Left foot is itching) and Tingling (To both feet )    44 y old female with pruritus and flakiness in between toes for months . She has tried OTC Lamisil with no relief . Also with tingling sensation in both feet . She feels that she drags her feet at times . + Lumbar back pain .       Review of Systems   Constitutional: Negative.    HENT: Negative.     Eyes: Negative.    Respiratory: Negative.     Cardiovascular: Negative.    Gastrointestinal: Negative.    Genitourinary: Negative.    Musculoskeletal: Negative.    Skin:  Positive for rash.   Neurological:  Positive for weakness.   Hematological: Negative.        Objective:      Physical Exam  Vitals and nursing note reviewed.   Constitutional:       General: She is not in acute distress.     Appearance: She is well-developed. She is not diaphoretic.   HENT:      Head: Normocephalic and atraumatic.      Right Ear: External ear normal.      Left Ear: External ear normal.      Nose: Nose normal.   Eyes:      General: No scleral icterus.        Left eye: No discharge.      Conjunctiva/sclera: Conjunctivae normal.      Pupils: Pupils are equal, round, and reactive to light.   Neck:      Thyroid: No thyromegaly.      Vascular: No JVD.      Trachea: No tracheal deviation.   Cardiovascular:      Rate and Rhythm: Normal rate and regular rhythm.      Heart sounds: Normal heart sounds. No murmur heard.     No friction rub. No gallop.   Pulmonary:      Effort: Pulmonary effort is normal. No respiratory distress.      Breath sounds: Normal breath sounds. No wheezing or rales.   Chest:      Chest wall: No tenderness.   Abdominal:      General: Bowel sounds are normal. There is no distension.      Palpations: Abdomen is soft. There is no mass.      Tenderness: There is no abdominal tenderness. There is no guarding.   Musculoskeletal:      Cervical back: Normal range of motion and  neck supple.   Feet:      Left foot:      Skin integrity: Skin breakdown and erythema present.   Lymphadenopathy:      Cervical: No cervical adenopathy.         Assessment:       1. Paresthesia of both feet    2. Tinea pedis of left foot        Plan:     Juana was seen today for foot problem and tingling.    Diagnoses and all orders for this visit:    Paresthesia of both feet  -     EMG W/ ULTRASOUND AND NERVE CONDUCTION TEST 2 Extremities; Future    Tinea pedis of left foot    Other orders  -     fluconazole (DIFLUCAN) 150 MG Tab; 1 tab po weekly for 4 weeks     EMG   Prn f.u

## 2024-06-28 ENCOUNTER — OFFICE VISIT (OUTPATIENT)
Dept: ENDOCRINOLOGY | Facility: CLINIC | Age: 44
End: 2024-06-28
Payer: COMMERCIAL

## 2024-06-28 DIAGNOSIS — E16.2 HYPOGLYCEMIA: Primary | ICD-10-CM

## 2024-06-28 PROCEDURE — 3044F HG A1C LEVEL LT 7.0%: CPT | Mod: CPTII,95,, | Performed by: INTERNAL MEDICINE

## 2024-06-28 PROCEDURE — 99204 OFFICE O/P NEW MOD 45 MIN: CPT | Mod: 95,,, | Performed by: INTERNAL MEDICINE

## 2024-06-28 PROCEDURE — 1159F MED LIST DOCD IN RCRD: CPT | Mod: CPTII,95,, | Performed by: INTERNAL MEDICINE

## 2024-06-28 PROCEDURE — 1160F RVW MEDS BY RX/DR IN RCRD: CPT | Mod: CPTII,95,, | Performed by: INTERNAL MEDICINE

## 2024-06-28 NOTE — PROGRESS NOTES
The patient location is:  [place]  The chief complaint leading to consultation is:  Hypoglycemia    Visit type: audiovisual    Face to Face time with patient:  15 minutes  35 minutes of total time spent on the encounter, which includes face to face time and non-face to face time preparing to see the patient (eg, review of tests), Obtaining and/or reviewing separately obtained history, Documenting clinical information in the electronic or other health record, Independently interpreting results (not separately reported) and communicating results to the patient/family/caregiver, or Care coordination (not separately reported).         Each patient to whom he or she provides medical services by telemedicine is:  (1) informed of the relationship between the physician and patient and the respective role of any other health care provider with respect to management of the patient; and (2) notified that he or she may decline to receive medical services by telemedicine and may withdraw from such care at any time.    Notes:        Subjective:      Patient ID: Juana Arellano is referred by No ref. provider found     Chief Complaint:  Hypoglycemia    History of Present Illness    Juana Arellano is a 44 y.o. female who presents for evaluation of hypoglycemia.     Patient reports feeling lightheaded after eating almost on a daily basis.  She had tried intermittent fasting but could not continue it due to dizziness.  Also reports dizziness when she stands up.  She has lightheadedness since 11 years of age.  Reports similar symptoms in family members and was told it was due to low blood sugar.  Has not checked her blood sugars during such episodes.     When she gets dizzy, she drinks water and gets something to eat like candy/juice/fruits with resolution of symptoms.   Denies nocturnal symptoms.   She reports feeling hungry and nauseaus when she wakes up.   Takes PPI for GERD.     Suspected prediabetes - blood sugars on  fingersticks were normal.  Reports possible GDM in 2006.   No history of gastric bypass surgery.    Has taken Metformin in the past - discontinued due to side effects.     Weight currently 286 lbs  BMI 47  Patient does not have regular exercise regimen.    Diet: Tries to eat less salt, snacks more on fruits.     FH of diabetes: Denies.       Lab Results   Component Value Date    GLU 96 01/29/2024     01/16/2024    GLU 90 09/15/2020    GLU 90 02/24/2020    GLU 92 10/03/2018    GLU 87 04/17/2018     (H) 01/25/2014    GLU 91 01/17/2012    GLU 76 10/25/2010    GLU 90 10/30/2009    GLU 82 08/20/2009    GLU 92 02/23/2009    GLU 86 06/02/2008    GLU 85 02/13/2008     Lab Results   Component Value Date    HGBA1C 5.3 01/16/2024    HGBA1C 5.5 09/13/2021    HGBA1C 5.4 08/11/2020    HGBA1C 5.3 02/24/2020    HGBA1C 5.0 04/17/2018       Quit smoking in 2015  Occasional ETOH.   Takes Delta gummies.     H/o Yeast infection - currently on diflucan.    ROS:   As above    Objective:     There were no vitals taken for this visit.  There is no height or weight on file to calculate BMI.    Physical Exam  Constitutional:       General: She is not in acute distress.     Appearance: She is not ill-appearing.   HENT:      Head: Normocephalic.   Eyes:      Conjunctiva/sclera: Conjunctivae normal.   Pulmonary:      Effort: Pulmonary effort is normal.   Neurological:      Mental Status: She is alert and oriented to person, place, and time.       Lab Review:   Lab Results   Component Value Date    HGBA1C 5.3 01/16/2024     Lab Results   Component Value Date    CHOL 168 01/16/2024    HDL 40 01/16/2024    LDLCALC 110.8 01/16/2024    TRIG 86 01/16/2024    CHOLHDL 23.8 01/16/2024     Lab Results   Component Value Date     01/29/2024    K 4.0 01/29/2024     01/29/2024    CO2 23 01/29/2024    GLU 96 01/29/2024    BUN 8 01/29/2024    CREATININE 0.8 01/29/2024    CALCIUM 9.1 01/29/2024    PROT 7.3 01/29/2024    ALBUMIN 3.7  01/29/2024    BILITOT 0.4 01/29/2024    ALKPHOS 100 01/29/2024    AST 18 01/29/2024    ALT 23 01/29/2024    ANIONGAP 10 01/29/2024    EGFRNORACEVR >60 01/29/2024    TSH 1.551 09/01/2022      Vit D, 25-Hydroxy   Date Value Ref Range Status   08/11/2020 26 (L) 30 - 96 ng/mL Final     Comment:     Vitamin D deficiency.........<10 ng/mL                              Vitamin D insufficiency......10-29 ng/mL       Vitamin D sufficiency........> or equal to 30 ng/mL  Vitamin D toxicity............>100 ng/mL         Assessment and Plan     Problem List Items Addressed This Visit          Endocrine    Hypoglycemia - Primary       Reports feeling lightheaded after eating.  Had similar symptoms since 11 years of age.  Has not checked her blood sugars during such episodes.  Reports symptoms improve after drinking water and taking a carbohydrate snacks.  Recent A1c was normal.  No documented hypoglycemia on labs.    Discussed checking blood sugars and document hypoglycemia during episodes of lightheadedness.  Encouraged diet modification, take more protein and complex carbohydrates.  Recommend regular exercise regimen.               Mariajose DUBOSE Rai, MD

## 2024-07-10 PROBLEM — E16.2 HYPOGLYCEMIA: Status: ACTIVE | Noted: 2024-07-10

## 2024-07-10 NOTE — ASSESSMENT & PLAN NOTE
Reports feeling lightheaded after eating.  Had similar symptoms since 11 years of age.  Has not checked her blood sugars during such episodes.  Reports symptoms improve after drinking water and taking a carbohydrate snacks.  Recent A1c was normal.  No documented hypoglycemia on labs.    Discussed checking blood sugars and document hypoglycemia during episodes of lightheadedness.  Encouraged diet modification, take more protein and complex carbohydrates.  Recommend regular exercise regimen.

## 2024-08-13 ENCOUNTER — PATIENT MESSAGE (OUTPATIENT)
Dept: FAMILY MEDICINE | Facility: CLINIC | Age: 44
End: 2024-08-13
Payer: COMMERCIAL

## 2024-08-13 DIAGNOSIS — F32.1 CURRENT MODERATE EPISODE OF MAJOR DEPRESSIVE DISORDER WITHOUT PRIOR EPISODE: Primary | ICD-10-CM

## 2024-10-15 ENCOUNTER — HOSPITAL ENCOUNTER (OUTPATIENT)
Dept: RADIOLOGY | Facility: HOSPITAL | Age: 44
Discharge: HOME | End: 2024-10-15
Attending: FAMILY MEDICINE
Payer: COMMERCIAL

## 2024-10-15 ENCOUNTER — OFFICE VISIT (OUTPATIENT)
Dept: FAMILY MEDICINE | Facility: CLINIC | Age: 44
End: 2024-10-15
Attending: FAMILY MEDICINE
Payer: COMMERCIAL

## 2024-10-15 VITALS
DIASTOLIC BLOOD PRESSURE: 82 MMHG | OXYGEN SATURATION: 97 % | BODY MASS INDEX: 48 KG/M2 | SYSTOLIC BLOOD PRESSURE: 124 MMHG | HEART RATE: 94 BPM | HEIGHT: 65 IN | TEMPERATURE: 98 F | WEIGHT: 288.13 LBS

## 2024-10-15 DIAGNOSIS — R06.02 SOB (SHORTNESS OF BREATH): ICD-10-CM

## 2024-10-15 DIAGNOSIS — J01.40 ACUTE PANSINUSITIS, RECURRENCE NOT SPECIFIED: ICD-10-CM

## 2024-10-15 DIAGNOSIS — R06.02 SOB (SHORTNESS OF BREATH): Primary | ICD-10-CM

## 2024-10-15 PROCEDURE — 3074F SYST BP LT 130 MM HG: CPT | Mod: CPTII,S$GLB,, | Performed by: FAMILY MEDICINE

## 2024-10-15 PROCEDURE — 3079F DIAST BP 80-89 MM HG: CPT | Mod: CPTII,S$GLB,, | Performed by: FAMILY MEDICINE

## 2024-10-15 PROCEDURE — 3008F BODY MASS INDEX DOCD: CPT | Mod: CPTII,S$GLB,, | Performed by: FAMILY MEDICINE

## 2024-10-15 PROCEDURE — 3044F HG A1C LEVEL LT 7.0%: CPT | Mod: CPTII,S$GLB,, | Performed by: FAMILY MEDICINE

## 2024-10-15 PROCEDURE — 99214 OFFICE O/P EST MOD 30 MIN: CPT | Mod: S$GLB,,, | Performed by: FAMILY MEDICINE

## 2024-10-15 PROCEDURE — 1160F RVW MEDS BY RX/DR IN RCRD: CPT | Mod: CPTII,S$GLB,, | Performed by: FAMILY MEDICINE

## 2024-10-15 PROCEDURE — 1159F MED LIST DOCD IN RCRD: CPT | Mod: CPTII,S$GLB,, | Performed by: FAMILY MEDICINE

## 2024-10-15 PROCEDURE — 71046 X-RAY EXAM CHEST 2 VIEWS: CPT | Mod: TC,PO

## 2024-10-15 PROCEDURE — 99999 PR PBB SHADOW E&M-EST. PATIENT-LVL IV: CPT | Mod: PBBFAC,,, | Performed by: FAMILY MEDICINE

## 2024-10-15 PROCEDURE — 71046 X-RAY EXAM CHEST 2 VIEWS: CPT | Mod: 26,,, | Performed by: RADIOLOGY

## 2024-10-15 RX ORDER — AMOXICILLIN 875 MG/1
875 TABLET, FILM COATED ORAL EVERY 12 HOURS
COMMUNITY
Start: 2024-10-07 | End: 2024-10-17

## 2024-10-15 RX ORDER — DEXBROMPHENIRAMINE MALEATE, PHENYLEPHRINE HYDROCHLORIDE 2; 7.5 MG/1; MG/1
1 TABLET ORAL 3 TIMES DAILY
COMMUNITY
Start: 2024-10-07

## 2024-10-15 NOTE — PROGRESS NOTES
Subjective:       Patient ID: Juana Arellano is a 44 y.o. female.    Chief Complaint: Cough and Nasal Congestion    44 y old female with nasal  congestion , productive cough , sob and chills for 10 d . Evaluated at   1 w ago . She tested negative for COVID . Started on Amoxicillin , ala hist  . SOB has worsened over the last 3 days .     Review of Systems   Constitutional: Negative.    HENT:  Positive for postnasal drip, rhinorrhea, sinus pressure and sinus pain.    Eyes: Negative.    Respiratory:  Positive for cough and shortness of breath.    Cardiovascular: Negative.    Gastrointestinal: Negative.    Genitourinary: Negative.    Musculoskeletal: Negative.    Skin: Negative.    Hematological: Negative.        Objective:      Physical Exam  Vitals and nursing note reviewed.   Constitutional:       General: She is not in acute distress.     Appearance: She is well-developed. She is not diaphoretic.   HENT:      Head: Normocephalic and atraumatic.      Right Ear: External ear normal.      Left Ear: External ear normal.      Nose: Nose normal.   Eyes:      General: No scleral icterus.        Left eye: No discharge.      Conjunctiva/sclera: Conjunctivae normal.      Pupils: Pupils are equal, round, and reactive to light.   Neck:      Thyroid: No thyromegaly.      Vascular: No JVD.      Trachea: No tracheal deviation.   Cardiovascular:      Rate and Rhythm: Normal rate and regular rhythm.      Heart sounds: Normal heart sounds. No murmur heard.     No friction rub. No gallop.   Pulmonary:      Effort: Pulmonary effort is normal. No respiratory distress.      Breath sounds: Normal breath sounds. No wheezing or rales.   Chest:      Chest wall: No tenderness.   Abdominal:      General: Bowel sounds are normal. There is no distension.      Palpations: Abdomen is soft. There is no mass.      Tenderness: There is no abdominal tenderness. There is no guarding.   Musculoskeletal:      Cervical back: Normal range of motion and  neck supple.   Lymphadenopathy:      Cervical: No cervical adenopathy.         Assessment:      Juana was seen today for cough and nasal congestion.    Diagnoses and all orders for this visit:    SOB (shortness of breath)  -     X-Ray Chest PA And Lateral; Future    Acute pansinusitis, recurrence not specified    Other orders  -     levoFLOXacin (LEVAQUIN) 750 MG tablet; Take 1 tablet (750 mg total) by mouth once daily.           Plan:   Call or return to clinic prn if these symptoms worsen or fail to improve as anticipated.

## 2024-10-16 RX ORDER — LEVOFLOXACIN 750 MG/1
750 TABLET ORAL DAILY
Qty: 7 TABLET | Refills: 0 | Status: SHIPPED | OUTPATIENT
Start: 2024-10-16

## 2024-12-16 ENCOUNTER — OFFICE VISIT (OUTPATIENT)
Dept: FAMILY MEDICINE | Facility: CLINIC | Age: 44
End: 2024-12-16
Payer: COMMERCIAL

## 2024-12-16 VITALS
TEMPERATURE: 97 F | BODY MASS INDEX: 48.19 KG/M2 | WEIGHT: 289.25 LBS | DIASTOLIC BLOOD PRESSURE: 84 MMHG | HEIGHT: 65 IN | SYSTOLIC BLOOD PRESSURE: 130 MMHG | HEART RATE: 93 BPM | OXYGEN SATURATION: 97 %

## 2024-12-16 DIAGNOSIS — F33.2 MDD (MAJOR DEPRESSIVE DISORDER), RECURRENT SEVERE, WITHOUT PSYCHOSIS: ICD-10-CM

## 2024-12-16 DIAGNOSIS — J30.9 ALLERGIC RHINITIS, UNSPECIFIED SEASONALITY, UNSPECIFIED TRIGGER: Primary | ICD-10-CM

## 2024-12-16 PROCEDURE — 3044F HG A1C LEVEL LT 7.0%: CPT | Mod: CPTII,S$GLB,, | Performed by: NURSE PRACTITIONER

## 2024-12-16 PROCEDURE — 1160F RVW MEDS BY RX/DR IN RCRD: CPT | Mod: CPTII,S$GLB,, | Performed by: NURSE PRACTITIONER

## 2024-12-16 PROCEDURE — 3008F BODY MASS INDEX DOCD: CPT | Mod: CPTII,S$GLB,, | Performed by: NURSE PRACTITIONER

## 2024-12-16 PROCEDURE — 99213 OFFICE O/P EST LOW 20 MIN: CPT | Mod: S$GLB,,, | Performed by: NURSE PRACTITIONER

## 2024-12-16 PROCEDURE — 1159F MED LIST DOCD IN RCRD: CPT | Mod: CPTII,S$GLB,, | Performed by: NURSE PRACTITIONER

## 2024-12-16 PROCEDURE — 3079F DIAST BP 80-89 MM HG: CPT | Mod: CPTII,S$GLB,, | Performed by: NURSE PRACTITIONER

## 2024-12-16 PROCEDURE — 3075F SYST BP GE 130 - 139MM HG: CPT | Mod: CPTII,S$GLB,, | Performed by: NURSE PRACTITIONER

## 2024-12-16 PROCEDURE — 99999 PR PBB SHADOW E&M-EST. PATIENT-LVL III: CPT | Mod: PBBFAC,,, | Performed by: NURSE PRACTITIONER

## 2024-12-16 RX ORDER — METHYLPREDNISOLONE 4 MG/1
TABLET ORAL
Qty: 1 EACH | Refills: 0 | Status: SHIPPED | OUTPATIENT
Start: 2024-12-16

## 2024-12-16 RX ORDER — AZELASTINE 1 MG/ML
2 SPRAY, METERED NASAL 2 TIMES DAILY
Qty: 30 ML | Refills: 0 | Status: SHIPPED | OUTPATIENT
Start: 2024-12-16 | End: 2025-12-16

## 2024-12-16 RX ORDER — FLUTICASONE PROPIONATE 50 MCG
2 SPRAY, SUSPENSION (ML) NASAL DAILY
Qty: 16 G | Refills: 0 | Status: SHIPPED | OUTPATIENT
Start: 2024-12-16

## 2024-12-16 RX ORDER — LEVOCETIRIZINE DIHYDROCHLORIDE 5 MG/1
5 TABLET, FILM COATED ORAL NIGHTLY
Qty: 90 TABLET | Refills: 3 | Status: SHIPPED | OUTPATIENT
Start: 2024-12-16 | End: 2025-12-16

## 2024-12-16 NOTE — PROGRESS NOTES
Subjective:       Patient ID: Juana Arellano is a 44 y.o. female.    Chief Complaint: Nasal Congestion (Several weeks ), Chest Pain (About a week ), Ear Fullness (Several weeks), Dizziness (Several weeks ), and Leg Pain (Right leg at times , x 1 week )    History of Present Illness    Patient presents today with congestion and dizziness. She reports experiencing congestion and a persistent cough since October. She denies taking any allergy medications. She experiences dizziness with positional changes, specifically when rolling over in bed.      ROS:  General: -fever, -chills, -fatigue, -weight gain, -weight loss  Eyes: -vision changes, -redness, -discharge  ENT: -ear pain, +nasal congestion, -sore throat  Cardiovascular: -chest pain, -palpitations, -lower extremity edema  Respiratory: +cough, -shortness of breath  Gastrointestinal: -abdominal pain, -nausea, -vomiting, -diarrhea, -constipation, -blood in stool  Genitourinary: -dysuria, -hematuria, -frequency  Musculoskeletal: -joint pain, -muscle pain  Skin: -rash, -lesion  Neurological: -headache, +dizziness, -numbness, -tingling  Psychiatric: -anxiety, -depression, -sleep difficulty        Past Medical History:   Diagnosis Date    Arthritis     Fibromyalgia     GERD (gastroesophageal reflux disease)     HTN (hypertension) 1/4/2021    PONV (postoperative nausea and vomiting)       Current Outpatient Medications on File Prior to Visit   Medication Sig Dispense Refill    cholecalciferol, vitamin D3, (VITAMIN D3 ORAL) 3 gummies a day      fluvoxaMINE (LUVOX) 50 MG Tab tablet Take 1 tablet (50 mg total) by mouth every evening. 30 tablet 2    hydroCHLOROthiazide (HYDRODIURIL) 12.5 MG Tab Take 1 tablet (12.5 mg total) by mouth once daily. 30 tablet 0    omeprazole (PRILOSEC OTC) 20 MG tablet Take 20 mg by mouth every morning.      traZODone (DESYREL) 150 MG tablet Take 1 tablet (150 mg total) by mouth nightly as needed for Insomnia. 90 tablet 0    [DISCONTINUED] ALAHIST  PE 2-7.5 mg Tab Take 1 tablet by mouth 3 (three) times daily. (Patient not taking: Reported on 12/16/2024)      [DISCONTINUED] levoFLOXacin (LEVAQUIN) 750 MG tablet Take 1 tablet (750 mg total) by mouth once daily. (Patient not taking: Reported on 12/16/2024) 7 tablet 0     No current facility-administered medications on file prior to visit.          Objective:      Physical Exam    General: In no acute distress.  Head: Normocephalic. Non traumatic.  Eyes: PERRLA. EOMs full. Conjunctivae clear. Fundi grossly normal.  Ears: EACs clear. TMs normal.  Nose: Mucosa pink. Mucosa moist. No obstruction. POST-NASAL DRIP.  Throat: Clear. No exudates. No lesions. COBBLESTONING.  Neck: Supple. No masses. No thyromegaly. No bruits.  Chest: Lungs clear. No rales. No rhonchi. No wheezes.  Heart: RRR. No murmurs. No rubs. No gallops.  Abdomen: Soft. No tenderness. No masses. BS normal.  : Normal external genitalia. No lesions. No discharge. No hernias  noted.  Back: Normal curvature. No scoliosis. No tenderness.  Extremities: Warm. Well perfused. No upper extremity edema. No lower extremity edema. FROM. No deformities. No joint erythema.  Neuro: No focal deficits appreciated. Good muscle tone. Normal response to visual stimuli. Normal response to auditory stimuli.  Skin: Normal. No rashes. No lesions noted.          Assessment/Plan:     1. Allergic rhinitis, unspecified seasonality, unspecified trigger    2. MDD (major depressive disorder), recurrent severe, without psychosis       Assessment & Plan    SEASONAL ALLERGIC RHINITIS:  - Assessed patient's symptoms as likely due to allergies, given the prolonged duration and nature of symptoms.  - Determined need for combination therapy to address allergic symptoms, including nasal congestion and post-nasal drip.  - Will initiate steroid treatment due to persistence of symptoms over several weeks.  - Discussed that combination therapy is necessary to address various allergic  symptoms.  - Started Aspirin nasal spray, to be used daily.  - Started Flonase nasal spray, to be used daily.  - Started Xyzal, to be taken daily.  - Started a course of oral steroids.  - Can skip a day of allergy medication occasionally if feeling excessively dry.               No follow-ups on file.    This note was generated with the assistance of ambient listening technology. Verbal consent was obtained by the patient and accompanying visitor(s) for the recording of patient appointment to facilitate this note. I attest to having reviewed and edited the generated note for accuracy, though some syntax or spelling errors may persist. Please contact the author of this note for any clarification.       Answers submitted by the patient for this visit:  Review of Systems Questionnaire (Submitted on 12/16/2024)  activity change: Yes  unexpected weight change: No  neck pain: Yes  hearing loss: No  rhinorrhea: Yes  trouble swallowing: No  eye discharge: No  visual disturbance: No  chest tightness: Yes  wheezing: Yes  chest pain: No  palpitations: Yes  blood in stool: No  constipation: No  vomiting: Yes  diarrhea: No  polydipsia: No  polyuria: No  difficulty urinating: No  hematuria: No  menstrual problem: No  dysuria: No  joint swelling: No  arthralgias: Yes  headaches: Yes  weakness: No  confusion: No  dysphoric mood: Yes

## 2024-12-18 ENCOUNTER — NURSE TRIAGE (OUTPATIENT)
Dept: ADMINISTRATIVE | Facility: CLINIC | Age: 44
End: 2024-12-18
Payer: COMMERCIAL

## 2024-12-18 ENCOUNTER — PATIENT MESSAGE (OUTPATIENT)
Dept: FAMILY MEDICINE | Facility: CLINIC | Age: 44
End: 2024-12-18
Payer: COMMERCIAL

## 2024-12-18 NOTE — TELEPHONE ENCOUNTER
[place]    PCP:  Dr. Amanda Neveslogg    Pt reports seen by PCP yesterday and prescribed steroid.  C/O exhaustion, fatigue, sleepiness, ongoing intermittent dizziness/palpitations (unchanged), chest discomfort, weakness, nausea, vomiting, urinating frequently, dry mouth, confusion, and excessive thirst.  Denies H/O DM, SOB, and fever.  Per protocol, care advised is call  now.  Pt VU and she or her Daughter will call EMS.  Advised to call for worsening/questions/concerns.  VU.    Reason for Disposition   Acting confused (e.g., disoriented, slurred speech)    Additional Information   Negative: Unconscious or difficult to awaken    Protocols used: Diabetes - High Blood Sugar-A-OH

## 2025-01-12 DIAGNOSIS — J30.9 ALLERGIC RHINITIS, UNSPECIFIED SEASONALITY, UNSPECIFIED TRIGGER: ICD-10-CM

## 2025-01-13 RX ORDER — DULOXETIN HYDROCHLORIDE 20 MG/1
20 CAPSULE, DELAYED RELEASE ORAL EVERY MORNING
COMMUNITY
Start: 2025-01-07

## 2025-01-13 RX ORDER — MECLIZINE HYDROCHLORIDE 25 MG/1
25 TABLET ORAL 3 TIMES DAILY
COMMUNITY
Start: 2024-12-27

## 2025-01-14 RX ORDER — FLUTICASONE PROPIONATE 50 MCG
SPRAY, SUSPENSION (ML) NASAL
Qty: 16 G | Refills: 0 | Status: SHIPPED | OUTPATIENT
Start: 2025-01-14

## 2025-02-12 DIAGNOSIS — I10 ESSENTIAL HYPERTENSION: ICD-10-CM

## 2025-03-20 ENCOUNTER — PATIENT MESSAGE (OUTPATIENT)
Dept: INTERNAL MEDICINE | Facility: CLINIC | Age: 45
End: 2025-03-20

## 2025-03-20 ENCOUNTER — LAB VISIT (OUTPATIENT)
Dept: LAB | Facility: HOSPITAL | Age: 45
End: 2025-03-20
Attending: FAMILY MEDICINE
Payer: COMMERCIAL

## 2025-03-20 ENCOUNTER — OFFICE VISIT (OUTPATIENT)
Dept: INTERNAL MEDICINE | Facility: CLINIC | Age: 45
End: 2025-03-20
Payer: COMMERCIAL

## 2025-03-20 VITALS
HEART RATE: 107 BPM | DIASTOLIC BLOOD PRESSURE: 80 MMHG | TEMPERATURE: 99 F | OXYGEN SATURATION: 97 % | RESPIRATION RATE: 18 BRPM | HEIGHT: 65 IN | BODY MASS INDEX: 47.9 KG/M2 | SYSTOLIC BLOOD PRESSURE: 132 MMHG | WEIGHT: 287.5 LBS

## 2025-03-20 DIAGNOSIS — Z12.31 BREAST CANCER SCREENING BY MAMMOGRAM: ICD-10-CM

## 2025-03-20 DIAGNOSIS — Z79.899 LONG TERM CURRENT USE OF DIURETIC: ICD-10-CM

## 2025-03-20 DIAGNOSIS — G47.00 INSOMNIA, UNSPECIFIED TYPE: Chronic | ICD-10-CM

## 2025-03-20 DIAGNOSIS — I10 PRIMARY HYPERTENSION: Chronic | ICD-10-CM

## 2025-03-20 DIAGNOSIS — F41.9 RECURRENT MODERATE MAJOR DEPRESSIVE DISORDER WITH ANXIETY: Chronic | ICD-10-CM

## 2025-03-20 DIAGNOSIS — E66.01 MORBID OBESITY WITH BMI OF 45.0-49.9, ADULT: Chronic | ICD-10-CM

## 2025-03-20 DIAGNOSIS — K59.09 CHRONIC CONSTIPATION: Chronic | ICD-10-CM

## 2025-03-20 DIAGNOSIS — F41.1 GENERALIZED ANXIETY DISORDER WITH PANIC ATTACKS: Chronic | ICD-10-CM

## 2025-03-20 DIAGNOSIS — Z13.220 ENCOUNTER FOR LIPID SCREENING FOR CARDIOVASCULAR DISEASE: ICD-10-CM

## 2025-03-20 DIAGNOSIS — F41.0 GENERALIZED ANXIETY DISORDER WITH PANIC ATTACKS: Chronic | ICD-10-CM

## 2025-03-20 DIAGNOSIS — F33.1 RECURRENT MODERATE MAJOR DEPRESSIVE DISORDER WITH ANXIETY: Chronic | ICD-10-CM

## 2025-03-20 DIAGNOSIS — Z13.6 ENCOUNTER FOR LIPID SCREENING FOR CARDIOVASCULAR DISEASE: ICD-10-CM

## 2025-03-20 DIAGNOSIS — J30.1 SEASONAL ALLERGIC RHINITIS DUE TO POLLEN: Chronic | ICD-10-CM

## 2025-03-20 DIAGNOSIS — Z13.1 SCREENING FOR DIABETES MELLITUS: ICD-10-CM

## 2025-03-20 DIAGNOSIS — K59.09 CHRONIC CONSTIPATION: Primary | Chronic | ICD-10-CM

## 2025-03-20 PROBLEM — F32.A MOOD DISORDER OF DEPRESSED TYPE: Status: RESOLVED | Noted: 2021-02-14 | Resolved: 2025-03-20

## 2025-03-20 PROBLEM — F33.0 RECURRENT MILD MAJOR DEPRESSIVE DISORDER WITH ANXIETY: Chronic | Status: ACTIVE | Noted: 2021-07-22

## 2025-03-20 LAB
ESTIMATED AVG GLUCOSE: 105 MG/DL (ref 68–131)
HBA1C MFR BLD: 5.3 % (ref 4–5.6)
TSH SERPL DL<=0.005 MIU/L-ACNC: 1.65 UIU/ML (ref 0.4–4)

## 2025-03-20 PROCEDURE — 80053 COMPREHEN METABOLIC PANEL: CPT | Performed by: FAMILY MEDICINE

## 2025-03-20 PROCEDURE — 99999 PR PBB SHADOW E&M-EST. PATIENT-LVL IV: CPT | Mod: PBBFAC,,, | Performed by: FAMILY MEDICINE

## 2025-03-20 PROCEDURE — 99215 OFFICE O/P EST HI 40 MIN: CPT | Mod: S$GLB,,, | Performed by: FAMILY MEDICINE

## 2025-03-20 PROCEDURE — 3079F DIAST BP 80-89 MM HG: CPT | Mod: CPTII,S$GLB,, | Performed by: FAMILY MEDICINE

## 2025-03-20 PROCEDURE — 3075F SYST BP GE 130 - 139MM HG: CPT | Mod: CPTII,S$GLB,, | Performed by: FAMILY MEDICINE

## 2025-03-20 PROCEDURE — 3008F BODY MASS INDEX DOCD: CPT | Mod: CPTII,S$GLB,, | Performed by: FAMILY MEDICINE

## 2025-03-20 PROCEDURE — 84443 ASSAY THYROID STIM HORMONE: CPT | Performed by: FAMILY MEDICINE

## 2025-03-20 PROCEDURE — G2211 COMPLEX E/M VISIT ADD ON: HCPCS | Mod: S$GLB,,, | Performed by: FAMILY MEDICINE

## 2025-03-20 PROCEDURE — 1160F RVW MEDS BY RX/DR IN RCRD: CPT | Mod: CPTII,S$GLB,, | Performed by: FAMILY MEDICINE

## 2025-03-20 PROCEDURE — 83036 HEMOGLOBIN GLYCOSYLATED A1C: CPT | Performed by: FAMILY MEDICINE

## 2025-03-20 PROCEDURE — 36415 COLL VENOUS BLD VENIPUNCTURE: CPT | Performed by: FAMILY MEDICINE

## 2025-03-20 PROCEDURE — 1159F MED LIST DOCD IN RCRD: CPT | Mod: CPTII,S$GLB,, | Performed by: FAMILY MEDICINE

## 2025-03-20 PROCEDURE — 80061 LIPID PANEL: CPT | Performed by: FAMILY MEDICINE

## 2025-03-20 RX ORDER — AZELASTINE 1 MG/ML
2 SPRAY, METERED NASAL 2 TIMES DAILY
Qty: 30 ML | Refills: 11 | Status: SHIPPED | OUTPATIENT
Start: 2025-03-20

## 2025-03-20 RX ORDER — PLANT STANOL ESTER 450 MG
1 TABLET ORAL DAILY
COMMUNITY
Start: 2025-03-20

## 2025-03-20 RX ORDER — LEVOCETIRIZINE DIHYDROCHLORIDE 5 MG/1
5 TABLET, FILM COATED ORAL NIGHTLY
Qty: 90 TABLET | Refills: 3 | Status: SHIPPED | OUTPATIENT
Start: 2025-03-20 | End: 2026-03-20

## 2025-03-20 RX ORDER — AMOXICILLIN 250 MG
1-2 CAPSULE ORAL DAILY
COMMUNITY
Start: 2025-03-20

## 2025-03-20 RX ORDER — HYDROCHLOROTHIAZIDE 12.5 MG/1
12.5 TABLET ORAL DAILY
Qty: 90 TABLET | Refills: 3 | Status: SHIPPED | OUTPATIENT
Start: 2025-03-20

## 2025-03-20 RX ORDER — TIRZEPATIDE 2.5 MG/.5ML
2.5 INJECTION, SOLUTION SUBCUTANEOUS
Qty: 2 ML | Refills: 2 | Status: SHIPPED | OUTPATIENT
Start: 2025-03-20

## 2025-03-20 RX ORDER — POLYETHYLENE GLYCOL 3350 17 G/17G
17 POWDER, FOR SOLUTION ORAL DAILY
Qty: 510 G | Refills: 11 | Status: SHIPPED | OUTPATIENT
Start: 2025-03-20

## 2025-03-20 RX ORDER — FLUTICASONE PROPIONATE 50 MCG
2 SPRAY, SUSPENSION (ML) NASAL DAILY
Qty: 16 G | Refills: 11 | Status: SHIPPED | OUTPATIENT
Start: 2025-03-20

## 2025-03-20 RX ORDER — FLUVOXAMINE MALEATE 50 MG/1
100 TABLET, FILM COATED ORAL NIGHTLY
COMMUNITY

## 2025-03-20 NOTE — PROGRESS NOTES
OFFICE VISIT 3/20/25  2:00 PM CDT    History of Present Illness    CHIEF COMPLAINT:  Juana presents today for an initial visit to establish care    CURRENT SYMPTOMS:  She reports a lingering head cold with symptoms persisting longer than expected, though denies feeling unwell overall.    MENTAL HEALTH:  She reports poorly controlled depression with fluctuating symptoms related to work situation and job search difficulties. She expresses difficulty maintaining hope but denies passive suicidal ideation. She has diagnoses of panic disorder and PTSD, with trauma history dating back to early childhood. She currently takes fluvoxamine 50 mg and follows with psychiatrist Dr. Richmond for management.    GASTROINTESTINAL:  She reports constipation with bowel movements every 3-4 days without intervention. She achieves daily bowel movements with use of Miralax or CBD gummies.    ALLERGIES AND ENT:  She reports generalized sensitivity to irritants despite negative allergy testing. She uses Xyzal for pollen-related symptoms and Astelin nasal spray. She reports previous use of Montelukast which was effective and well-tolerated.    MEDICAL HISTORY:  She has a history of chronic anemia which previously prevented her from donating blood.    CURRENT MEDICATIONS:  She takes hydrochlorothiazide in the morning for blood pressure management with good tolerance.       Review of Systems   Constitutional:  Negative for unexpected weight change.   HENT:  Negative for hearing loss, rhinorrhea and trouble swallowing.    Eyes:  Negative for discharge and visual disturbance.   Respiratory:  Negative for chest tightness and wheezing.    Cardiovascular:  Negative for chest pain and palpitations.   Gastrointestinal:  Positive for constipation. Negative for blood in stool, diarrhea and vomiting.   Endocrine: Negative for polydipsia and polyuria.   Genitourinary:  Negative for difficulty urinating, dysuria, hematuria and menstrual problem.    Musculoskeletal:  Negative for joint swelling and neck pain.   Psychiatric/Behavioral:  Positive for dysphoric mood. Negative for confusion and suicidal ideas.        Assessment & Plan    IMPRESSION:   Assessed constipation and recommended combination of fiber supplements, prunes, and as-needed laxatives to manage symptoms. Evaluated current psychiatric medications and dosing, noting difficulty with medication adherence. Reviewed allergy symptoms and current treatments. Considered HTN management. Discussed obesity treatment options, focusing on GLP-1 receptor agonists (e.g., tirzepatide) as potential therapy. Performed brief physical exam, noting clear lungs, regular heart rhythm, and normal thyroid on palpation.    PATIENT EDUCATION:   Explained proper technique for using nasal sprays to maximize effectiveness. Discussed metabolic nature of obesity and challenges of weight loss with elevated BMI. Provided information on GLP-1 receptor agonists for weight management, including brand names and insurance considerations. Explained compounding pharmacies as potential alternative for medication access.    ACTION ITEMS/LIFESTYLE:   Juana to monitor bowel movements and adjust constipation management accordingly. Juana to implement daily use of pill dispenser to improve medication adherence. Recommend increasing daily fiber intake through diet and supplements. Juana to research insurance coverage for weight loss medications and explore alternative options if needed. Juana to download and prepare [employer] genetic test results for discussion at next appointment. Juana to send [employer] genetic test results as PDF attachment via patient portal message before next appointment. Juana to review provided information on weight loss medications and insurance coverage before next appointment.    MEDICATIONS:   Started Konsyl (psyllium husk) and Senna with Docusate as needed for constipation. Continued fluvoxamine 100 mg daily.  Restarted Xyzal as needed for allergy symptoms. Continued Azelastine nasal spray, 2 sprays each nostril daily. Started Montelukast daily for allergy management. Continued HCTZ in the morning for HTN. Started [employer] potassium supplement, 1 tablet daily due to diuretic use. Prescribed Zepbound (tirzepatide) for weight management, pending insurance approval.    ORDERS:   Ordered routine labs including thyroid function test, lipid panel, diabetes screening, and metabolic panel due to weight concerns. Annual mammogram ordered as a standing order.    FOLLOW UP:   Juana to schedule mammogram in 2 weeks. Follow up in about a month via video visit to discuss weight management medication results and options.       1. Chronic constipation  -     senna-docusate 8.6-50 mg (SENNA WITH DOCUSATE SODIUM) 8.6-50 mg per tablet; Take 1-2 tablets by mouth once daily. Use as directed on product packaging.  -     psyllium husk 6 gram/6 gram Powd; Use as directed on product packaging (for fiber supplement). Ask pharmacist for guidance if needed.  -     polyethylene glycol (MIRALAX) 17 gram/dose powder; Take 17 g by mouth once daily. Use as directed. Hold if stool becomes too loose or frequent.  Dispense: 510 g; Refill: 11  -     TSH; Future; Expected date: 03/20/2025    2. Insomnia, unspecified type  -     Comprehensive Metabolic Panel; Future; Expected date: 03/20/2025    3. Recurrent moderate major depressive disorder with anxiety  Overview:  PSYCHIATRIST: Clement King MD  [address]  [facility] [address]  Phone: [phone]  Via Fax: [phone]       4. Generalized anxiety disorder with panic attacks  Overview:  PSYCHIATRIST: Clement King MD  [address]  [facility] [address]  Phone: [phone]  Via Fax: [phone]       5. Seasonal allergic rhinitis due to pollen  -     azelastine (ASTELIN) 137 mcg (0.1 %) nasal spray; 2 sprays (274 mcg total) by Nasal route 2 (two) times daily.  Dispense: 30  "mL; Refill: 11  -     fluticasone propionate (FLONASE) 50 mcg/actuation nasal spray; 2 sprays (100 mcg total) by Each Nostril route once daily.  Dispense: 16 g; Refill: 11  -     levocetirizine (XYZAL) 5 MG tablet; Take 1 tablet (5 mg total) by mouth every evening.  Dispense: 90 tablet; Refill: 3    6. Primary hypertension  -     hydroCHLOROthiazide 12.5 MG Tab; Take 1 tablet (12.5 mg total) by mouth once daily.  Dispense: 90 tablet; Refill: 3  -     Comprehensive Metabolic Panel; Future; Expected date: 03/20/2025    7. Long term current use of diuretic  -     potassium gluconate 550 mg (90 mg) Tab; Take 1 tablet by mouth once daily.    8. Screening for diabetes mellitus  -     Comprehensive Metabolic Panel; Future; Expected date: 03/20/2025    9. Encounter for lipid screening for cardiovascular disease  -     Lipid Panel; Future; Expected date: 03/20/2025    10. Morbid obesity with BMI of 45.0-49.9, adult  Overview:  No history or family history of thyroid cancer or multiple endocrine neoplasia syndrome.     Assessment & Plan:  Wt Readings from Last 6 Encounters:   03/20/25 130.4 kg (287 lb 7.7 oz)   12/16/24 131.2 kg (289 lb 3.9 oz)   10/15/24 130.7 kg (288 lb 2.3 oz)   06/25/24 129.9 kg (286 lb 6 oz)   05/10/24 128.5 kg (283 lb 4.7 oz)   01/29/24 132.2 kg (291 lb 7.2 oz)     Estimated body mass index is 47.84 kg/m² as calculated from the following:    Height as of this encounter: 5' 5" (1.651 m).    Weight as of this encounter: 130.4 kg (287 lb 7.7 oz).    We discussed risks and benefits of treatment options.     Orders:  -     Comprehensive Metabolic Panel; Future; Expected date: 03/20/2025  -     TSH; Future; Expected date: 03/20/2025  -     Lipid Panel; Future; Expected date: 03/20/2025  -     Hemoglobin A1C; Future; Expected date: 03/20/2025  -     tirzepatide, weight loss, (ZEPBOUND) 2.5 mg/0.5 mL PnIj; Inject 2.5 mg into the skin every 7 days.  Dispense: 2 mL; Refill: 2    11. Breast cancer screening by " "mammogram  -     Mammo Digital Screening Bilat w/ Santos (XPD); Standing       Unless specified otherwise, chronic conditions are represented as and appear to be compensated/controlled and stable.  Today's visit involved the intricate management of episodic problem(s) and the ongoing care for the patient's serious or complex condition(s) listed above, reflecting the inherent complexity of providing longitudinal, comprehensive evaluation and management as the central hub for the patient's primary care services.  [place] Board [employer]acy Controlled Prescription Drug Monitoring data reviewed.  Refer to Patient Portal Message and/or After Visit Summary for additional education/instructions provided.  Vitals:    03/20/25 1409   BP: 132/80   BP Location: Left arm   Patient Position: Sitting   Pulse: 107   Resp: 18   Temp: 98.9 °F (37.2 °C)   TempSrc: Tympanic   SpO2: 97%   Weight: 130.4 kg (287 lb 7.7 oz)   Height: 5' 5" (1.651 m)   Physical Exam  Vitals reviewed.   Constitutional:       General: She is not in acute distress.     Appearance: Normal appearance. She is not ill-appearing, toxic-appearing or diaphoretic.   HENT:      Head: Normocephalic and atraumatic.   Eyes:      General: No scleral icterus.     Conjunctiva/sclera: Conjunctivae normal.   Neck:      Thyroid: No thyroid mass, thyromegaly or thyroid tenderness.      Vascular: No carotid bruit.   Cardiovascular:      Rate and Rhythm: Normal rate and regular rhythm.   Pulmonary:      Effort: Pulmonary effort is normal.      Breath sounds: Normal breath sounds.   Skin:     General: Skin is warm and dry.   Neurological:      General: No focal deficit present.      Mental Status: She is alert and oriented to person, place, and time. Mental status is at baseline.   Psychiatric:         Mood and Affect: Mood normal.         Behavior: Behavior normal.         Thought Content: Thought content normal.         Judgment: Judgment normal.     I spent a total of 40 minutes " today evaluating and managing this patient for this encounter.  This includes face to face time and non-face to face time preparing to see the patient (eg, review of tests), obtaining and/or reviewing separately obtained history, documenting clinical information in the electronic or other health record, independently interpreting results and communicating results to the patient/family/caregiver, or care coordinator. This time was spent personally by me on the following activities: review of nurse's notes, pre-charting, review of patient's past medical history, assessing age-appropriate health maintenance needs, review of any interval history, medication reconciliation, reviewing/reconciling/updating problem list, reviewing/reconciling/updating PMFSH, obtaining history from the patient, examination of the patient, review and interpretation of lab results, review and interpretation of imaging test results, reviewing other provider's chart notes, evaluation of the patient's response to treatment, review of [place] Board of Pharmacy Controlled Prescription Drug Monitoring database records for the patient, managing and/or ordering prescription medications, discussing strategies to help overcome any barriers to adherence to medication regimen, ordering labs, ordering imaging tests, educating patient and answering their questions about risks and benefits of treatment options, educating patient and answering their questions about treatment plan, goals of treatment, and follow-up, educating patient about needed cancer screenings, counseling on appropriate therapeutic lifestyle changes, communicating instructions to my staff about the treatment plan, and final documentation of the visit.  This time was exclusive of any separately billable procedures for this patient and exclusive of time spent treating any other patient.    This note was generated with the assistance of ambient listening technology. Verbal consent was  "obtained by the patient and accompanying visitor(s) for the recording of patient appointment to facilitate this note. I attest to having reviewed and edited the generated note for accuracy, though some syntax or spelling errors may persist. Please contact the author of this note for any clarification.    Documentation entered by me for this encounter may have been done in part using speech-recognition technology. Although I have made an effort to ensure accuracy, "sound like" errors may exist and should be interpreted in context.     Follow up in about 1 month (around 4/20/2025) for virtual visit, w/ me.    "

## 2025-03-20 NOTE — ASSESSMENT & PLAN NOTE
"Wt Readings from Last 6 Encounters:   03/20/25 130.4 kg (287 lb 7.7 oz)   12/16/24 131.2 kg (289 lb 3.9 oz)   10/15/24 130.7 kg (288 lb 2.3 oz)   06/25/24 129.9 kg (286 lb 6 oz)   05/10/24 128.5 kg (283 lb 4.7 oz)   01/29/24 132.2 kg (291 lb 7.2 oz)     Estimated body mass index is 47.84 kg/m² as calculated from the following:    Height as of this encounter: 5' 5" (1.651 m).    Weight as of this encounter: 130.4 kg (287 lb 7.7 oz).    We discussed risks and benefits of treatment options.   "

## 2025-03-21 DIAGNOSIS — E66.01 MORBID OBESITY WITH BMI OF 45.0-49.9, ADULT: Chronic | ICD-10-CM

## 2025-03-21 LAB
ALBUMIN SERPL BCP-MCNC: 3.9 G/DL (ref 3.5–5.2)
ALP SERPL-CCNC: 112 U/L (ref 40–150)
ALT SERPL W/O P-5'-P-CCNC: 19 U/L (ref 10–44)
ANION GAP SERPL CALC-SCNC: 14 MMOL/L (ref 8–16)
AST SERPL-CCNC: 17 U/L (ref 10–40)
BILIRUB SERPL-MCNC: 0.2 MG/DL (ref 0.1–1)
BUN SERPL-MCNC: 14 MG/DL (ref 6–20)
CALCIUM SERPL-MCNC: 9.2 MG/DL (ref 8.7–10.5)
CHLORIDE SERPL-SCNC: 106 MMOL/L (ref 95–110)
CHOLEST SERPL-MCNC: 186 MG/DL (ref 120–199)
CHOLEST/HDLC SERPL: 4.7 {RATIO} (ref 2–5)
CO2 SERPL-SCNC: 19 MMOL/L (ref 23–29)
CREAT SERPL-MCNC: 0.8 MG/DL (ref 0.5–1.4)
EST. GFR  (NO RACE VARIABLE): >60 ML/MIN/1.73 M^2
GLUCOSE SERPL-MCNC: 94 MG/DL (ref 70–110)
HDLC SERPL-MCNC: 40 MG/DL (ref 40–75)
HDLC SERPL: 21.5 % (ref 20–50)
LDLC SERPL CALC-MCNC: 115.2 MG/DL (ref 63–159)
NONHDLC SERPL-MCNC: 146 MG/DL
POTASSIUM SERPL-SCNC: 3.9 MMOL/L (ref 3.5–5.1)
PROT SERPL-MCNC: 7.5 G/DL (ref 6–8.4)
SODIUM SERPL-SCNC: 139 MMOL/L (ref 136–145)
TRIGL SERPL-MCNC: 154 MG/DL (ref 30–150)

## 2025-04-16 ENCOUNTER — RESULTS FOLLOW-UP (OUTPATIENT)
Dept: INTERNAL MEDICINE | Facility: CLINIC | Age: 45
End: 2025-04-16

## 2025-04-21 ENCOUNTER — PATIENT MESSAGE (OUTPATIENT)
Dept: INTERNAL MEDICINE | Facility: CLINIC | Age: 45
End: 2025-04-21
Payer: COMMERCIAL

## 2025-04-23 ENCOUNTER — PATIENT MESSAGE (OUTPATIENT)
Dept: INTERNAL MEDICINE | Facility: CLINIC | Age: 45
End: 2025-04-23

## 2025-04-23 ENCOUNTER — OFFICE VISIT (OUTPATIENT)
Dept: INTERNAL MEDICINE | Facility: CLINIC | Age: 45
End: 2025-04-23
Payer: COMMERCIAL

## 2025-04-23 VITALS — WEIGHT: 280.81 LBS | HEIGHT: 65 IN | BODY MASS INDEX: 46.78 KG/M2

## 2025-04-23 DIAGNOSIS — E66.01 MORBID OBESITY WITH BMI OF 45.0-49.9, ADULT: Primary | Chronic | ICD-10-CM

## 2025-04-23 DIAGNOSIS — I10 PRIMARY HYPERTENSION: Chronic | ICD-10-CM

## 2025-04-23 DIAGNOSIS — K59.09 CHRONIC CONSTIPATION: Chronic | ICD-10-CM

## 2025-04-23 PROBLEM — Z30.2 ENCOUNTER FOR STERILIZATION: Status: RESOLVED | Noted: 2018-11-07 | Resolved: 2025-04-23

## 2025-04-23 PROBLEM — E66.09 OBESITY DUE TO EXCESS CALORIES: Status: RESOLVED | Noted: 2018-04-16 | Resolved: 2025-04-23

## 2025-04-23 PROCEDURE — 1159F MED LIST DOCD IN RCRD: CPT | Mod: CPTII,95,, | Performed by: FAMILY MEDICINE

## 2025-04-23 PROCEDURE — 1160F RVW MEDS BY RX/DR IN RCRD: CPT | Mod: CPTII,95,, | Performed by: FAMILY MEDICINE

## 2025-04-23 PROCEDURE — 3044F HG A1C LEVEL LT 7.0%: CPT | Mod: CPTII,95,, | Performed by: FAMILY MEDICINE

## 2025-04-23 PROCEDURE — G2211 COMPLEX E/M VISIT ADD ON: HCPCS | Mod: 95,,, | Performed by: FAMILY MEDICINE

## 2025-04-23 PROCEDURE — 98006 SYNCH AUDIO-VIDEO EST MOD 30: CPT | Mod: 95,,, | Performed by: FAMILY MEDICINE

## 2025-04-23 RX ORDER — TIRZEPATIDE 5 MG/.5ML
5 INJECTION, SOLUTION SUBCUTANEOUS
Qty: 2 ML | Refills: 3 | Status: SHIPPED | OUTPATIENT
Start: 2025-04-23

## 2025-04-23 NOTE — ASSESSMENT & PLAN NOTE
"Wt Readings from Last 6 Encounters:   04/23/25 127.4 kg (280 lb 12.8 oz)   03/20/25 130.4 kg (287 lb 7.7 oz)   12/16/24 131.2 kg (289 lb 3.9 oz)   10/15/24 130.7 kg (288 lb 2.3 oz)   06/25/24 129.9 kg (286 lb 6 oz)   05/10/24 128.5 kg (283 lb 4.7 oz)     Estimated body mass index is 46.73 kg/m² as calculated from the following:    Height as of this encounter: 5' 5" (1.651 m).    Weight as of this encounter: 127.4 kg (280 lb 12.8 oz).    "

## 2025-04-23 NOTE — ASSESSMENT & PLAN NOTE
BP Readings from Last 6 Encounters:   03/20/25 132/80   12/16/24 130/84   10/15/24 124/82   06/25/24 120/80   05/10/24 132/82   01/29/24 (!) 154/87     Lab Results   Component Value Date    EGFRNORACEVR >60.0 03/20/2025    CREATININE 0.8 03/20/2025    BUN 14 03/20/2025    K 3.9 03/20/2025     03/20/2025     03/20/2025     Results for orders placed or performed during the hospital encounter of 01/29/24   EKG 12-lead    Collection Time: 01/29/24  9:57 AM    Narrative    Test Reason : R07.9,    Vent. Rate : 078 BPM     Atrial Rate : 078 BPM     P-R Int : 140 ms          QRS Dur : 078 ms      QT Int : 392 ms       P-R-T Axes : 025 077 022 degrees     QTc Int : 446 ms    Normal sinus rhythm  Normal ECG  When compared with ECG of 22-FEB-2022 12:14,  No significant change was found  Confirmed by MONIK DIMAS MD ([id number]) on 1/29/2024 8:41:51 PM    Referred By: [email or web address]   SELF           Confirmed By:MONIK DIMAS MD

## 2025-04-23 NOTE — PROGRESS NOTES
TELEMEDICINE VIRTUAL VIDEO VISIT  4/23/25 11:20 AM CDT    Visit Type: Audiovisual    Patient's Location: Juana represents that they are located within the state of [place].    History of Present Illness      Morbid obesity with BMI of 45.0-49.9, adult: She was monitored with serial weight and BMI measurements, showing a decrease from 287 lb 7.7 oz on 3/20/25 to 280 lb 12.8 oz on 4/23/25, for an estimated BMI of 46.73 kg/m². She reported diminished hunger with tirzepatide (Zepbound) and weight loss of approximately 7 lbs since initiation. At this visit, I increased tirzepatide (Zepbound) to 5 mg subcutaneously weekly due to adequate tolerability and efficacy. She continues regular exercise, including walking, and was referred to Nutrition Services for dietary counseling. Periodic lab monitoring for electrolyte levels was ordered, and she was educated on maintaining potassium balance during weight loss and diuretic therapy. I instructed her to continue monitoring fiber intake. Follow-up is scheduled in 3 months to evaluate her response to the dose adjustment.    Primary hypertension: She was monitored with serial clinic blood pressure measurements and recent readings have remained within normal limits, the last recorded being 132/80 on 3/20/25. Serum creatinine, eGFR, BUN, potassium, and sodium levels on 3/20/25 were stable and within normal ranges. She is maintained on hydrochlorothiazide 12.5 mg daily and a potassium supplement. I reinforced the importance of potassium monitoring while on diuretic therapy especially during weight loss. Nutrition Services referral for dietary counseling was provided. She will continue current exercise and dietary routines, and labs will be periodically monitored to assess electrolyte balance.     Chronic constipation: Her constipation was followed through assessment of symptom control and laxative use. She previously required magnesium citrate but has obtained better relief after  "switching from alternate-day Miralax to senna with docusate. She discontinued the fiber supplement and continues to use dietary fiber through food and psyllium husk as tolerated. Miralax, senna-docusate, and psyllium husk were continued. I instructed her to continue monitoring fiber intake and exercise. Additional patient education was provided regarding bowel regimen adjustments as needed. Periodic lab monitoring will assess for potential electrolyte imbalance related to laxative use and diuretic therapy, with follow-up planned in 3 months.      Review of Systems   HENT:  Negative for hearing loss, rhinorrhea and trouble swallowing.    Eyes:  Negative for discharge and visual disturbance.   Respiratory:  Negative for chest tightness and wheezing.    Cardiovascular:  Negative for chest pain and palpitations.   Gastrointestinal:  Negative for blood in stool, diarrhea and vomiting.   Endocrine: Negative for polydipsia and polyuria.   Genitourinary:  Negative for difficulty urinating, dysuria, hematuria and menstrual problem.   Musculoskeletal:  Negative for arthralgias, joint swelling and neck pain.   Neurological:  Negative for weakness and headaches.   Psychiatric/Behavioral:  Negative for confusion and dysphoric mood.      1. Morbid obesity with BMI of 45.0-49.9, adult  Overview:  No history or family history of thyroid cancer or multiple endocrine neoplasia syndrome.     Assessment & Plan:  Wt Readings from Last 6 Encounters:   04/23/25 127.4 kg (280 lb 12.8 oz)   03/20/25 130.4 kg (287 lb 7.7 oz)   12/16/24 131.2 kg (289 lb 3.9 oz)   10/15/24 130.7 kg (288 lb 2.3 oz)   06/25/24 129.9 kg (286 lb 6 oz)   05/10/24 128.5 kg (283 lb 4.7 oz)     Estimated body mass index is 46.73 kg/m² as calculated from the following:    Height as of this encounter: 5' 5" (1.651 m).    Weight as of this encounter: 127.4 kg (280 lb 12.8 oz).      Orders:  -     tirzepatide, weight loss, (ZEPBOUND) 5 mg/0.5 mL Soln; Inject 0.5 mLs (5 mg " "total) into the skin every 7 days.  Dispense: 2 mL; Refill: 3  -     Ambulatory referral/consult to Nutrition Services; Future; Expected date: 04/30/2025    2. Primary hypertension  Assessment & Plan:  BP Readings from Last 6 Encounters:   03/20/25 132/80   12/16/24 130/84   10/15/24 124/82   06/25/24 120/80   05/10/24 132/82   01/29/24 (!) 154/87     Lab Results   Component Value Date    EGFRNORACEVR >60.0 03/20/2025    CREATININE 0.8 03/20/2025    BUN 14 03/20/2025    K 3.9 03/20/2025     03/20/2025     03/20/2025     Results for orders placed or performed during the hospital encounter of 01/29/24   EKG 12-lead    Collection Time: 01/29/24  9:57 AM    Narrative    Test Reason : R07.9,    Vent. Rate : 078 BPM     Atrial Rate : 078 BPM     P-R Int : 140 ms          QRS Dur : 078 ms      QT Int : 392 ms       P-R-T Axes : 025 077 022 degrees     QTc Int : 446 ms    Normal sinus rhythm  Normal ECG  When compared with ECG of 22-FEB-2022 12:14,  No significant change was found  Confirmed by MONIK DIMAS MD ([id number]) on 1/29/2024 8:41:51 PM    Referred By: ALEXANDRA   SELF           Confirmed By:MONIK DIMAS MD          3. Chronic constipation       Unless specified otherwise, chronic conditions are represented as and appear to be compensated/controlled and stable.  Today's visit involved the intricate management of episodic problem(s) and the ongoing care for the patient's serious or complex condition(s) listed above, reflecting the inherent complexity of providing longitudinal, comprehensive evaluation and management as the central hub for the patient's primary care services.  Any education and instructions provided to the patient via Patient Portal Message today are incorporated herein by reference.  Vitals:    04/23/25 1152   Weight: 127.4 kg (280 lb 12.8 oz)   Height: 5' 5" (1.651 m)     PHYSICAL EXAM:  GENERAL APPEARANCE:  - Alert and grossly oriented.  - No apparent distress, breathing comfortably.   " "  EYES:  - Sclera without icterus.     EARS, NOSE, AND THROAT:  - No visible abnormalities.     RESPIRATORY:  - No respiratory distress.  - No audible wheezing or cough.     PSYCHIATRIC:  - Mood and affect appropriate; behavior cooperative.  This note was generated with the assistance of ambient listening technology. Verbal consent was obtained by the patient and accompanying visitor(s) for the recording of patient appointment to facilitate this note. I attest to having reviewed and edited the generated note for accuracy, though some syntax or spelling errors may persist. Please contact the author of this note for any clarification.    I spent a total of 20 minutes today evaluating and managing this patient for this encounter.  This includes face to face time and non-face to face time preparing to see the patient (eg, review of tests), obtaining and/or reviewing separately obtained history, documenting clinical information in the electronic or other health record, independently interpreting results and communicating results to the patient/family/caregiver, or care coordinator.  This time was exclusive of any separately billable procedures for this patient and exclusive of time spent treating any other patient.    Documentation entered by me for this encounter may have been done in part using speech-recognition technology. Although I have made an effort to ensure accuracy, "sound like" errors may exist and should be interpreted in context.    Each patient to whom medical services are provided by telemedicine is: (1) informed of the relationship between the physician and patient and the respective role of any other health care provider with respect to management of the patient; and (2) notified that he or she may decline to receive medical services by telemedicine and may withdraw from such care at any time.    Follow up in about 3 months (around 7/23/2025) for virtual visit, w/ me.  Future Appointments   Date Time " Provider Department Center   4/28/2025  1:40 PM [facility] [id number] [facility] MAMMO [facility]

## 2025-04-24 ENCOUNTER — PATIENT MESSAGE (OUTPATIENT)
Dept: INTERNAL MEDICINE | Facility: CLINIC | Age: 45
End: 2025-04-24
Payer: COMMERCIAL

## 2025-05-05 ENCOUNTER — HOSPITAL ENCOUNTER (OUTPATIENT)
Dept: RADIOLOGY | Facility: HOSPITAL | Age: 45
Discharge: HOME | End: 2025-05-05
Attending: FAMILY MEDICINE
Payer: COMMERCIAL

## 2025-05-05 DIAGNOSIS — Z12.31 BREAST CANCER SCREENING BY MAMMOGRAM: ICD-10-CM

## 2025-05-05 PROCEDURE — 77067 SCR MAMMO BI INCL CAD: CPT | Mod: TC

## 2025-05-05 PROCEDURE — 77067 SCR MAMMO BI INCL CAD: CPT | Mod: 26,,, | Performed by: STUDENT IN AN ORGANIZED HEALTH CARE EDUCATION/TRAINING PROGRAM

## 2025-05-05 PROCEDURE — 77063 BREAST TOMOSYNTHESIS BI: CPT | Mod: 26,,, | Performed by: STUDENT IN AN ORGANIZED HEALTH CARE EDUCATION/TRAINING PROGRAM

## 2025-05-06 NOTE — PROGRESS NOTES
Hi, Juana,    I am happy to report that your recent breast imaging did not show any evidence of cancer.    An annual mammogram is the best test available for breast cancer screening, though it is not perfect and can sometimes miss cancers. Even though your mammogram was normal, please schedule an appointment with me for a proper evaluation if you notice any lump or change in one of your breasts.    Thank you for letting me care for you, and thank you for trusting [facility] with your healthcare needs.    All the best,    Dr. ODONNELL

## 2025-05-14 ENCOUNTER — PATIENT MESSAGE (OUTPATIENT)
Dept: INTERNAL MEDICINE | Facility: CLINIC | Age: 45
End: 2025-05-14
Payer: COMMERCIAL

## 2025-07-02 ENCOUNTER — PATIENT MESSAGE (OUTPATIENT)
Dept: INTERNAL MEDICINE | Facility: CLINIC | Age: 45
End: 2025-07-02
Payer: COMMERCIAL

## 2025-07-16 ENCOUNTER — PATIENT MESSAGE (OUTPATIENT)
Dept: INTERNAL MEDICINE | Facility: CLINIC | Age: 45
End: 2025-07-16

## 2025-07-16 ENCOUNTER — PATIENT MESSAGE (OUTPATIENT)
Dept: INTERNAL MEDICINE | Facility: CLINIC | Age: 45
End: 2025-07-16
Payer: COMMERCIAL

## 2025-07-16 ENCOUNTER — OFFICE VISIT (OUTPATIENT)
Dept: INTERNAL MEDICINE | Facility: CLINIC | Age: 45
End: 2025-07-16
Payer: COMMERCIAL

## 2025-07-16 VITALS — WEIGHT: 265 LBS | BODY MASS INDEX: 44.15 KG/M2 | HEIGHT: 65 IN

## 2025-07-16 DIAGNOSIS — I10 PRIMARY HYPERTENSION: Chronic | ICD-10-CM

## 2025-07-16 DIAGNOSIS — E66.01 MORBID OBESITY WITH BMI OF 45.0-49.9, ADULT: Chronic | ICD-10-CM

## 2025-07-16 DIAGNOSIS — E66.813 CLASS 3 SEVERE OBESITY DUE TO EXCESS CALORIES WITH SERIOUS COMORBIDITY AND BODY MASS INDEX (BMI) OF 40.0 TO 44.9 IN ADULT: Primary | Chronic | ICD-10-CM

## 2025-07-16 PROCEDURE — 3044F HG A1C LEVEL LT 7.0%: CPT | Mod: CPTII,95,, | Performed by: FAMILY MEDICINE

## 2025-07-16 PROCEDURE — 1159F MED LIST DOCD IN RCRD: CPT | Mod: CPTII,95,, | Performed by: FAMILY MEDICINE

## 2025-07-16 PROCEDURE — 98006 SYNCH AUDIO-VIDEO EST MOD 30: CPT | Mod: 95,,, | Performed by: FAMILY MEDICINE

## 2025-07-16 PROCEDURE — G2211 COMPLEX E/M VISIT ADD ON: HCPCS | Mod: 95,,, | Performed by: FAMILY MEDICINE

## 2025-07-16 PROCEDURE — 1160F RVW MEDS BY RX/DR IN RCRD: CPT | Mod: CPTII,95,, | Performed by: FAMILY MEDICINE

## 2025-07-16 RX ORDER — TIRZEPATIDE 5 MG/.5ML
5 INJECTION, SOLUTION SUBCUTANEOUS
Qty: 2 ML | Refills: 5 | Status: SHIPPED | OUTPATIENT
Start: 2025-07-16

## 2025-07-16 RX ORDER — HYDROCHLOROTHIAZIDE 12.5 MG/1
12.5 TABLET ORAL DAILY
Qty: 90 TABLET | Refills: 2 | Status: SHIPPED | OUTPATIENT
Start: 2025-07-16

## 2025-07-16 NOTE — ASSESSMENT & PLAN NOTE
Wt Readings from Last 6 Encounters:   07/16/25 120.2 kg (265 lb)   04/23/25 127.4 kg (280 lb 12.8 oz)   03/20/25 130.4 kg (287 lb 7.7 oz)   12/16/24 131.2 kg (289 lb 3.9 oz)   10/15/24 130.7 kg (288 lb 2.3 oz)   06/25/24 129.9 kg (286 lb 6 oz)     BMI Readings from Last 5 Encounters:   07/16/25 44.10 kg/m²   04/23/25 46.73 kg/m²   03/20/25 47.84 kg/m²   12/16/24 48.13 kg/m²   10/15/24 47.95 kg/m²

## 2025-07-16 NOTE — PROGRESS NOTES
TELEMEDICINE VIRTUAL VIDEO VISIT  7/16/25 11:20 AM CDT    Visit Type: Audiovisual    Patient's Location: [place] represents that they are located within the state [facility].    CHIEF COMPLAINT: Follow-up     SUMMARY: She lost 22 lbs since April with tirzepatide (Zepbound), BMI decreased from 48 to 44, and continues gradual weight loss with ongoing medication and exercise; BP is adequately controlled with hydrochlorothiazide, with consideration for future discontinuation as weight loss progresses; follow-up is planned with Vicky Roger PA-C in January.    Class 3 severe obesity due to excess calories with serious comorbidity and body mass index (BMI) of 40.0 to 44.9 in adult: I reviewed her weight and BMI trends, noting that her weight decreased from 131.2 kg (289 lb) in December 2024 to 120.2 kg (265 lb) on 7/16/25, with BMI dropping from 48.13 kg/m2 to 44.1 kg/m2 over the same period. She reported a total loss of 22 lbs since starting tirzepatide (Zepbound) in April, averaging about 1 lb per week, with no plateau periods and no significant side effects. She engaged in regular exercise, walking 4-5 days per week, and reported increased physical activity. On exam, her weight was 265 lbs and BMI was 44. I continued tirzepatide (Zepbound) 5 mg weekly, provided the option for future dose increase if weight loss plateaus, and reinforced ongoing exercise. I educated her that gradual weight loss tends to be more sustainable, while also clarifying that faster weight loss with medications like Zepbound can still be sustained. Follow-up for annual review and medication renewals was scheduled with Vicky Roger PA-C in January.    Morbid obesity with BMI of 45.0-49.9, adult: I monitored her BMI trend, which decreased from 48.13 kg/m2 in December 2024 to 44.1 kg/m2 on 7/16/25, and continued tirzepatide (Zepbound) 5 mg weekly. She reported ongoing weight loss and good tolerance of the medication. She obtained Zepbound  monthly from [facility]. I instructed her to contact the office via e-visit if she wishes to increase the dose in the future. Exercise and dietary measures were reinforced as ongoing components of her weight management plan.    Primary hypertension: I assessed her blood pressure trends, noting recent office readings of 132/80 (3/20/25), 130/84 (12/16/24), and 124/82 (10/15/24), reflecting adequate control. She continued hydrochlorothiazide 12.5 mg daily, obtained from [facility], and reported no side effects. I noted the potential for future discontinuation of hydrochlorothiazide as weight loss progresses. Lifestyle modification with regular exercise and ongoing weight loss was reinforced as part of her management plan.    Follow up in January with Vicky Roger PA-C for annual in-office follow-up and medication renewals. She was instructed to contact the office to initiate an e-visit for weight management if she wishes to increase her tirzepatide (Zepbound) dose in the future.      1. Class 3 severe obesity due to excess calories with serious comorbidity and body mass index (BMI) of 40.0 to 44.9 in adult  Overview:  No history or family history of thyroid cancer or multiple endocrine neoplasia syndrome.     Assessment & Plan:  Wt Readings from Last 6 Encounters:   07/16/25 120.2 kg (265 lb)   04/23/25 127.4 kg (280 lb 12.8 oz)   03/20/25 130.4 kg (287 lb 7.7 oz)   12/16/24 131.2 kg (289 lb 3.9 oz)   10/15/24 130.7 kg (288 lb 2.3 oz)   06/25/24 129.9 kg (286 lb 6 oz)     BMI Readings from Last 5 Encounters:   07/16/25 44.10 kg/m²   04/23/25 46.73 kg/m²   03/20/25 47.84 kg/m²   12/16/24 48.13 kg/m²   10/15/24 47.95 kg/m²          2. Morbid obesity with BMI of 45.0-49.9, adult  -     tirzepatide, weight loss, (ZEPBOUND) 5 mg/0.5 mL Soln; Inject 0.5 mLs (5 mg total) into the skin every 7 days.  Dispense: 2 mL; Refill: 5    3. Primary hypertension  -     hydroCHLOROthiazide 12.5 MG Tab; Take 1 tablet (12.5  "mg total) by mouth once daily.  Dispense: 90 tablet; Refill: 2       Unless specified otherwise, chronic conditions are represented as and appear to be compensated/controlled and stable.  Today's visit involved the intricate management of episodic problem(s) and the ongoing care for the patient's serious or complex condition(s) listed above, reflecting the inherent complexity of providing longitudinal, comprehensive evaluation and management as the central hub for the patient's primary care services.  Vitals:    07/16/25 1125   Weight: 120.2 kg (265 lb)   Height: 5' 5" (1.651 m)     PHYSICAL EXAM:  GENERAL APPEARANCE:  - Alert and grossly oriented.  - No apparent distress, breathing comfortably.     EYES:  - Sclera without icterus.     EARS, NOSE, AND THROAT:  - No visible abnormalities.     RESPIRATORY:  - No respiratory distress.  - No audible wheezing or cough.     PSYCHIATRIC:  - Mood and affect appropriate; behavior cooperative.  Review of Systems   Constitutional:  Negative for unexpected weight change.   HENT:  Negative for hearing loss and trouble swallowing.    Eyes:  Negative for discharge and visual disturbance.   Respiratory:  Negative for chest tightness and wheezing.    Cardiovascular:  Negative for chest pain and palpitations.   Gastrointestinal:  Negative for blood in stool, constipation, diarrhea and vomiting.   Endocrine: Negative for polydipsia and polyuria.   Genitourinary:  Negative for difficulty urinating, dysuria, hematuria and menstrual problem.   Musculoskeletal:  Negative for arthralgias, joint swelling and neck pain.   Neurological:  Negative for weakness.   Psychiatric/Behavioral:  Negative for confusion and dysphoric mood.      I spent a total of 15 minutes today evaluating and managing this patient for this encounter.  This includes face to face time and non-face to face time preparing to see the patient (eg, review of tests), obtaining and/or reviewing separately obtained history, " documenting clinical information in the electronic or other health record, independently interpreting results and communicating results to the patient/family/caregiver, or care coordinator.  This time was exclusive of any separately billable procedures for this patient and exclusive of time spent treating any other patient.    Documentation entered by me for this encounter may have been done in part using ambient-listening and speech-recognition technologies. I have reviewed the content for accuracy, though errors in syntax, spelling, or similar-sounding words may be present and should be interpreted in context. Please contact the author for any clarification.     Each patient to whom medical services are provided by telemedicine is: (1) informed of the relationship between the physician and patient and the respective role of any other health care provider with respect to management of the patient; and (2) notified that he or she may decline to receive medical services by telemedicine and may withdraw from such care at any time.    Follow up in about 6 months (around 1/16/2026) for OV with Vicky Roger PA-C.

## 2025-07-18 ENCOUNTER — NUTRITION (OUTPATIENT)
Dept: INTERNAL MEDICINE | Facility: CLINIC | Age: 45
End: 2025-07-18
Payer: COMMERCIAL

## 2025-07-18 DIAGNOSIS — E66.01 MORBID OBESITY WITH BMI OF 45.0-49.9, ADULT: Chronic | ICD-10-CM

## 2025-07-18 NOTE — PROGRESS NOTES
"Nutrition Assessment  Session Time:  60 minutes      Client name:  Juana Arellano  :  1980  Age:  45 y.o.  Gender:  female    Client states:  referred by KORTNEY Moran MD with a diagnosis of:   -Morbid obesity with BMI of 45.0-49.9, adult     Seeking guidance on weight loss.  Actively losing weight.  Prescribed Zepbound.  Experience some constipation for 1-2 days after injection. But has noticed improvement when consistent with exercise + increased water + increased fiber/prunes.    Began exercising consistently in May.  Exercises for 30 minutes 4 days weekly. Alternates between walking, treadmill, elliptical.  Works a sedentary job but tries to move around throughout the day on breaks as able.    Sample intake:  Breakfast: none, not hungry until 9am. Morning nausea even prior to medication  Lunch: wild rice + brown rice cup + 1 scoop collagen (unflavored) + powdered buillon (18 grams protein) + Tuna pouch + Diet Dr. Pepper  Snack: V8 drink, prunes  Dinner: pork + rice fuchs + asparagus// plant based meat nachos + salad// sauteed mushrooms + broccoli   Snack (portions reduced compared to before medication): icee pop// chips// ice cream   Drinks: water, may have soda with dinner or glass of milk    Alcohol: 1-2 drinks throughout the week, not weekly    Intake throughout the day is the same as before but choices were different. Healthy choice at lunch (higher salt, less protein)    Does not track often.  Knows vegetables + fruits are lacking.   Past when tracking intake: 1400 calories, 105 grams     Anthropometrics  Height:  65"     Weight:  265.3 lbs  BMI:  44.2  % Body Fat:  n/a     RECENT WEIGHTS      Weight (lb) Weight (kg) BMI (Calculated)   2024 289.57  131.35  48.2    2024 287.7  130.5  47.9    2024 291.45  132.2     5/10/2024 283.29  128.5  47.1    2024 286.38  129.9  47.7    10/15/2024 288.14  130.7  47.9    2024 289.24  131.2  48.1    3/20/2025 287.48  130.4  47.8  "   4/23/2025 280.8  127.37  46.7    7/16/2025 265  120.203  44.1          Clinical Signs/Symptoms  N/V/D:  none noted  Appetite:  good       Past Medical History:   Diagnosis Date    Arthritis     Fibromyalgia     GERD (gastroesophageal reflux disease)     HTN (hypertension) 1/4/2021    PONV (postoperative nausea and vomiting)        Past Surgical History:   Procedure Laterality Date    CLEFT LIP REPAIR      x 6    COLONOSCOPY N/A 10/22/2020    Procedure: COLONOSCOPY;  Surgeon: Orlin Fisher MD;  Location: [facility];  Service: Endoscopy;  Laterality: N/A;    COSMETIC SURGERY      cleft lip repair    ESOPHAGOGASTRODUODENOSCOPY      ESOPHAGOGASTRODUODENOSCOPY N/A 10/22/2020    Procedure: EGD (ESOPHAGOGASTRODUODENOSCOPY);  Surgeon: Orlin Fisher MD;  Location: [facility];  Service: Endoscopy;  Laterality: N/A;    HAND SURGERY      LAPAROSCOPIC OCCLUSION OF OVIDUCTS BY DEVICE Bilateral 11/7/2018    Procedure: OCCLUSION, FALLOPIAN TUBE, LAPAROSCOPIC, USING DEVICE;  Surgeon: Alayna Arechiga MD;  Location: [facility];  Service: OB/GYN;  Laterality: Bilateral;    REMOVAL OF INTRAUTERINE DEVICE (IUD) N/A 11/7/2018    Procedure: REMOVAL, INTRAUTERINE DEVICE;  Surgeon: Alayna Arechiga MD;  Location: [facility];  Service: OB/GYN;  Laterality: N/A;    TUBAL LIGATION         Medications    has a current medication list which includes the following prescription(s): azelastine, cholecalciferol (vitamin d3), fluticasone propionate, fluvoxamine, hydrochlorothiazide, levocetirizine, omeprazole, polyethylene glycol, potassium gluconate, psyllium husk, senna-docusate, zepbound, and trazodone.    Vitamins, Minerals, and/or Supplements:  none noted     Food/Medication Interactions:  Reviewed     Food Allergies or Intolerances:  NKFA noted in chart     Social History    Marital status:    Employment:  yes    Social History     Tobacco Use    Smoking status: Former     Current packs/day: 0.00     Types: Cigarettes     Quit date:  2013     Years since quittin.0     Passive exposure: Never    Smokeless tobacco: Never   Substance Use Topics    Alcohol use: No        Lab Reports   Sodium   Date Value Ref Range Status   2025 139 136 - 145 mmol/L Final     Potassium   Date Value Ref Range Status   2025 3.9 3.5 - 5.1 mmol/L Final     Chloride   Date Value Ref Range Status   2025 106 95 - 110 mmol/L Final     CO2   Date Value Ref Range Status   2025 19 (L) 23 - 29 mmol/L Final     Glucose   Date Value Ref Range Status   2025 94 70 - 110 mg/dL Final     BUN   Date Value Ref Range Status   2025 14 6 - 20 mg/dL Final     Creatinine   Date Value Ref Range Status   2025 0.8 0.5 - 1.4 mg/dL Final     Calcium   Date Value Ref Range Status   2025 9.2 8.7 - 10.5 mg/dL Final     Total Protein   Date Value Ref Range Status   2025 7.5 6.0 - 8.4 g/dL Final     Albumin   Date Value Ref Range Status   2025 3.9 3.5 - 5.2 g/dL Final     Total Bilirubin   Date Value Ref Range Status   2025 0.2 0.1 - 1.0 mg/dL Final     Comment:     For infants and newborns, interpretation of results should be based  on gestational age, weight and in agreement with clinical  observations.    Premature Infant recommended reference ranges:  Up to 24 hours.............<8.0 mg/dL  Up to 48 hours............<12.0 mg/dL  3-5 days..................<15.0 mg/dL  6-29 days.................<15.0 mg/dL       Alkaline Phosphatase   Date Value Ref Range Status   2025 112 40 - 150 U/L Final     AST   Date Value Ref Range Status   2025 17 10 - 40 U/L Final     ALT   Date Value Ref Range Status   2025 19 10 - 44 U/L Final     Anion Gap   Date Value Ref Range Status   2025 14 8 - 16 mmol/L Final     eGFR if    Date Value Ref Range Status   09/15/2020 >60.0 >60 mL/min/1.73 m^2 Final     eGFR if non    Date Value Ref Range Status   09/15/2020 >60.0 >60 mL/min/1.73 m^2 Final      Comment:     Calculation used to obtain the estimated glomerular filtration  rate (eGFR) is the CKD-EPI equation.         Lab Results   Component Value Date    WBC 8.71 01/29/2024    HGB 12.5 01/29/2024    HCT 37.0 01/29/2024    MCV 82 01/29/2024     01/29/2024        Lab Results   Component Value Date    CHOL 186 03/20/2025     Lab Results   Component Value Date    HDL 40 03/20/2025     Lab Results   Component Value Date    LDLCALC 115.2 03/20/2025     Lab Results   Component Value Date    TRIG 154 (H) 03/20/2025     Lab Results   Component Value Date    CHOLHDL 21.5 03/20/2025     Lab Results   Component Value Date    HGBA1C 5.3 03/20/2025     BP Readings from Last 1 Encounters:   03/20/25 132/80       Food History  reviewed    Exercise History:  reviewed    Cultural/Spiritual/Personal Preferences:  None identified    Support System:  family/friends    State of Change:  [place]    Barriers to Change:  none    Diagnosis    obesity related to previous excess energy intake as evidenced by BMI 44.    Intervention    RMR (Method:  [place] [address] [employer]):  1849 kcal  Activity Factor:  1.2    SLIM:  2218 kcal    Goals:  1.  7958-0459 calories per day  2.  100 grams protein per day (1.2-1.5 grams protein per kg adjusted ideal bodyweight)  3.  Protein + fiber each meal/snack  4. Increase exercise intensity/frequency/duration as able  Nutrition Education  The following education was provided to the patient:  Discussed weight management.  Suggested dietary modifications based on current dietary behaviors and individual food preferences.    Patient verbalized understanding of nutrition education and recommendations received.    Handouts Provided, will send via message  Nerve.comping List  Recipe Links  High Protein Meals/Snacks    Monitoring/Evaluation    Monitor the following:  Weight  BMI  Caloric intake      Follow Up Plan:  as needed

## 2025-07-21 ENCOUNTER — PATIENT MESSAGE (OUTPATIENT)
Dept: INTERNAL MEDICINE | Facility: CLINIC | Age: 45
End: 2025-07-21
Payer: COMMERCIAL

## 2025-08-02 ENCOUNTER — E-VISIT (OUTPATIENT)
Dept: INTERNAL MEDICINE | Facility: CLINIC | Age: 45
End: 2025-08-02
Payer: COMMERCIAL

## 2025-08-02 DIAGNOSIS — E66.813 CLASS 3 SEVERE OBESITY DUE TO EXCESS CALORIES WITH SERIOUS COMORBIDITY AND BODY MASS INDEX (BMI) OF 40.0 TO 44.9 IN ADULT: Primary | Chronic | ICD-10-CM

## 2025-08-04 DIAGNOSIS — E66.01 MORBID OBESITY WITH BMI OF 45.0-49.9, ADULT: Chronic | ICD-10-CM

## 2025-08-05 NOTE — PROGRESS NOTES
Patient ID: [place] Arellano is a 45 y.o. female.    Chief Complaint: General Illness (Entered automatically based on patient selection in [facility].)    The patient initiated a request through [facility] on 8/2/2025 for evaluation and management with a chief complaint of General Illness (Entered automatically based on patient selection in [facility].)     I evaluated the questionnaire submission on 08/05/2025.  Documentation entered by me for this encounter may have been done in part using speech-recognition technology. I have reviewed the content for accuracy, though errors in syntax, spelling, or similar-sounding words may be present and should be interpreted in context. Please contact the author for any clarification.     [facility]-Medication    8/2/2025  7:01 PM CDT - Filed by Patient   What do you need help with? Medication Management   Do you agree to participate in an E-Visit? Yes   If you have any of the following symptoms, please present to your local emergency room or call 911:  I acknowledge   Medication requests for narcotics will not be addressed via an E-Visit.  Please schedule an appointment. I acknowledge   Do you have any of the following pregnancy-related conditions? (Pregnant, Possibly pregnant, Breast feeding, None) None   Do you want to address a new or existing medication? (Start a new medication, Address a current medication) Address a current medication   What is the main issue you would like addressed today? Increase Zepbound to 7.5   Would you like to change or continue your medication? (Change medication, Continue medication) Change medication   What medication would you like to change? Zepbound   What is your current dose? 5   How often do you take your medication? (Once daily, Twice daily, Three times daily, Four times daily, Five times daily, Less than once a week, Once a week, More than once a week) Once a week   Why do you need to change your medication? (Cost or insurance  "issues, Medication no longer available, Side effects, Medication not effective) Medication not effective   What effect has your medication had on your problem? (None, Less than expected) Less than expected    What medical condition is the  medication intended to treat? Weight loss   Provide any information you feel is important to your history not asked above    Please attach any relevant images or files    Are you able to take your vitals? No       Current Outpatient Medications   Medication    azelastine (ASTELIN) 137 mcg (0.1 %) nasal spray    cholecalciferol, vitamin D3, (VITAMIN D3 ORAL)    fluticasone propionate (FLONASE) 50 mcg/actuation nasal spray    fluvoxaMINE (LUVOX) 50 MG Tab tablet    hydroCHLOROthiazide 12.5 MG Tab    levocetirizine (XYZAL) 5 MG tablet    omeprazole (PRILOSEC OTC) 20 MG tablet    polyethylene glycol (MIRALAX) 17 gram/dose powder    potassium gluconate 550 mg (90 mg) Tab    psyllium husk 6 gram/6 gram Powd    senna-docusate 8.6-50 mg (SENNA WITH DOCUSATE SODIUM) 8.6-50 mg per tablet    tirzepatide, weight loss, (ZEPBOUND) 5 mg/0.5 mL Soln    traZODone (DESYREL) 150 MG tablet     No current facility-administered medications for this visit.      Estimated body mass index is 44.1 kg/m² as calculated from the following:    Height as of this encounter: 5' 5" (1.651 m).    Weight as of this encounter: 120.2 kg (265 lb).    1. Class 3 severe obesity due to excess calories with serious comorbidity and body mass index (BMI) of 40.0 to 44.9 in adult  Overview:  No history or family history of thyroid cancer or multiple endocrine neoplasia syndrome.     Assessment & Plan:  Wt Readings from Last 6 Encounters:   08/07/25 120.2 kg (265 lb)   07/16/25 120.2 kg (265 lb)   04/23/25 127.4 kg (280 lb 12.8 oz)   03/20/25 130.4 kg (287 lb 7.7 oz)   12/16/24 131.2 kg (289 lb 3.9 oz)   10/15/24 130.7 kg (288 lb 2.3 oz)     Estimated body mass index is 44.1 kg/m² as calculated from the following:    Height as " "of this encounter: 5' 5" (1.651 m).    Weight as of this encounter: 120.2 kg (265 lb).           There are no discontinued medications.       No follow-ups on file.  Future Appointments   Date Time Provider Department Center   1/14/2026 10:00 AM Vicky Roger PA-C [facility]   5/6/2026  3:20 PM [facility]        E-Visit Time Tracking:  Day 1 Time (in minutes): 7  Day 2 Time (in minutes): 7    Total Time (in minutes): 14  This time was exclusive of any separately billable procedures for this patient and exclusive of time spent treating any other patient.    Juana Gutierrez.    Thanks for reaching out. Im sorry to hear that youre not seeing the progress you were hoping for. Some people do need a higher dose of Zepbound to achieve their weight loss goals, and youve been on a relatively low dose at 5 mg. Im happy to help guide you through the next steps.    Your message didnt include your current weight, and thats an important detail I need before I can determine the best way to adjust your dose. Please reply to this message with your current weight to move things forward.    Unfortunately, the E-Visit platform isnt very intuitive right now--were working on improving that. For future messages, if you choose chronic conditions" and then "weight management instead of medication management, the system will prompt you to enter your weight and other more relevant information for this kind of request.    I also understand that youd like your prescription sent to [facility]. Please let me know whether you prefer the Zepbound pens or vials so I can make sure your prescription is sent correctly.    Best regards,    Dr. ODONNELL  ADDENDUM 08/07/2025 7:36 AM    Juana Gutierrez.    Kirt sent in a new prescription for tirzepatide 7.5 mg vials to [facility] as you requested. This is the next step up in the dosing schedule, and its typically well-tolerated. As always, keep an eye out for " any side effects, and let me know if anything feels off.    Please take the time now to click on the accompanying link to schedule a virtual video visit appointment with me in 3 month to re-evaluate your progress. If you have any difficulty scheduling this appointment, call our appointment desk at [phone], and they will be glad to help.     Thank you for allowing me and my team to care for you, and for placing your trust in [facility] for your healthcare needs.     All the best,    Dr. ODONNELL      Medications Discontinued During This Encounter   Medication Reason    tirzepatide, weight loss, (ZEPBOUND) 5 mg/0.5 mL Soln Dose adjustment     Medications Ordered This Encounter   Medications    tirzepatide, weight loss, (ZEPBOUND) 7.5 mg/0.5 mL Soln     Sig: Inject 7.5 mg into the skin every 7 days.     Dispense:  2 mL     Refill:  3

## 2025-08-07 VITALS — WEIGHT: 265 LBS | BODY MASS INDEX: 44.15 KG/M2 | HEIGHT: 65 IN

## 2025-08-07 RX ORDER — TIRZEPATIDE 7.5 MG/.5ML
7.5 INJECTION, SOLUTION SUBCUTANEOUS
Qty: 2 ML | Refills: 3 | Status: SHIPPED | OUTPATIENT
Start: 2025-08-07

## 2025-08-07 NOTE — ASSESSMENT & PLAN NOTE
"Wt Readings from Last 6 Encounters:   08/07/25 120.2 kg (265 lb)   07/16/25 120.2 kg (265 lb)   04/23/25 127.4 kg (280 lb 12.8 oz)   03/20/25 130.4 kg (287 lb 7.7 oz)   12/16/24 131.2 kg (289 lb 3.9 oz)   10/15/24 130.7 kg (288 lb 2.3 oz)     Estimated body mass index is 44.1 kg/m² as calculated from the following:    Height as of this encounter: 5' 5" (1.651 m).    Weight as of this encounter: 120.2 kg (265 lb).    "

## 2025-08-12 ENCOUNTER — E-VISIT (OUTPATIENT)
Dept: URGENT CARE | Facility: CLINIC | Age: 45
End: 2025-08-12
Payer: COMMERCIAL

## 2025-08-12 DIAGNOSIS — B86 SCABIES: Primary | ICD-10-CM

## 2025-08-12 RX ORDER — TIRZEPATIDE 5 MG/.5ML
INJECTION, SOLUTION SUBCUTANEOUS
Qty: 6 ML | Refills: 0 | OUTPATIENT
Start: 2025-08-12

## 2025-08-12 RX ORDER — PERMETHRIN 50 MG/G
CREAM TOPICAL ONCE
Qty: 60 G | Refills: 0 | Status: SHIPPED | OUTPATIENT
Start: 2025-08-12 | End: 2025-08-12